# Patient Record
Sex: MALE | Race: WHITE | NOT HISPANIC OR LATINO | Employment: FULL TIME | URBAN - METROPOLITAN AREA
[De-identification: names, ages, dates, MRNs, and addresses within clinical notes are randomized per-mention and may not be internally consistent; named-entity substitution may affect disease eponyms.]

---

## 2020-04-15 ENCOUNTER — HOSPITAL ENCOUNTER (EMERGENCY)
Facility: HOSPITAL | Age: 45
Discharge: HOME | End: 2020-04-15
Payer: COMMERCIAL

## 2020-04-15 VITALS
TEMPERATURE: 98 F | RESPIRATION RATE: 18 BRPM | DIASTOLIC BLOOD PRESSURE: 73 MMHG | SYSTOLIC BLOOD PRESSURE: 139 MMHG | OXYGEN SATURATION: 100 % | HEART RATE: 74 BPM

## 2020-04-15 DIAGNOSIS — J06.9 VIRAL URI WITH COUGH: Primary | ICD-10-CM

## 2020-04-15 LAB — SARS-COV-2 RDRP RESP QL NAA+PROBE: NEGATIVE

## 2020-04-15 PROCEDURE — 99282 EMERGENCY DEPT VISIT SF MDM: CPT

## 2020-04-15 PROCEDURE — U0002 COVID-19 LAB TEST NON-CDC: HCPCS

## 2020-04-15 NOTE — ED PROVIDER NOTES
Encounter Date: 4/15/2020    SCRIBE #1 NOTE: ICrystal, am scribing for, and in the presence of, JOANNE JoseC.       History     Chief Complaint   Patient presents with    COVID-19 Concerns     cough, loss of taste and smell, tightness in chest        Time seen by provider: 2:23 PM on 04/15/2020    Jordan Mulligan is a 44 y.o. male who presents to the ED with c/o a dry cough x4 days. He reports associated loss of smell and taste and fever x4 days. No CP, SOB, or n/v/d. Patient reports positive COVID-19 contact from his ex wife. He has no other complaints. No PMHx or PSHx. NKDA.         Review of patient's allergies indicates:  No Known Allergies  No past medical history on file.  No past surgical history on file.  No family history on file.  Social History     Tobacco Use    Smoking status: Never Smoker    Smokeless tobacco: Current User   Substance Use Topics    Alcohol use: No    Drug use: No     Review of Systems   Constitutional: Positive for fever.   HENT: Negative for congestion.         + decrease in smell and taste   Eyes: Negative for visual disturbance.   Respiratory: Positive for cough (dry). Negative for shortness of breath and wheezing.    Cardiovascular: Negative for chest pain.   Gastrointestinal: Negative for abdominal pain, diarrhea, nausea and vomiting.   Genitourinary: Negative for dysuria.   Musculoskeletal: Negative for joint swelling.   Skin: Negative for rash.   Neurological: Negative for syncope.   Hematological: Does not bruise/bleed easily.   Psychiatric/Behavioral: Negative for confusion.       Physical Exam     Initial Vitals [04/15/20 1415]   BP Pulse Resp Temp SpO2   139/73 74 18 98.2 °F (36.8 °C) 100 %      MAP       --         Physical Exam    Nursing note and vitals reviewed.  Constitutional: He appears well-developed and well-nourished. He is cooperative.  Non-toxic appearance. He does not have a sickly appearance.   HENT:   Head: Normocephalic and atraumatic.    Right Ear: Tympanic membrane and external ear normal.   Left Ear: Tympanic membrane and external ear normal.   Nose: Nose normal.   Mouth/Throat: Uvula is midline and oropharynx is clear and moist.   Eyes: Conjunctivae and lids are normal.   Neck: Normal range of motion and full passive range of motion without pain. Neck supple.   Cardiovascular: Normal rate, regular rhythm and normal heart sounds. Exam reveals no gallop and no friction rub.    No murmur heard.  Pulmonary/Chest: Breath sounds normal. He has no wheezes. He has no rhonchi. He has no rales.   Abdominal: Normal appearance.   Neurological: He is alert.   Skin: Skin is warm, dry and intact. No rash noted.         ED Course   Procedures  Labs Reviewed   SARS-COV-2 RNA AMPLIFICATION, QUAL          Imaging Results    None          Medical Decision Making:   History:   Old Medical Records: I decided to obtain old medical records.  Differential Diagnosis:   URI  Bronchitis  Sinusitis  Clinical Tests:   Lab Tests: Ordered and Reviewed       APC / Resident Notes:   Patient is a 44 y.o. male who presents to the ED 04/15/2020 who underwent emergent evaluation for cough. + exposure to COVID- 19 positive patient and requesting testing. COVID- 19 test negative. Discussed with patient possibility of false negative testing and he is given quarantine and specific return precautions including worsening shortness of breath. The patient appears to have a viral upper respiratory infection.  Based upon the history and physical exam the patient does not appear to have a serious bacterial infection such as pneumonia, sepsis, otitis media, bacterial sinusitis, strep pharyngitis, parapharyngeal or peritonsillar abscess, meningitis.  Patient appears very well and I have given specific return precautions to the patient.rs.  The patient can take over the counter medications and does not appear to need antibiotics at this time.             Scribe Attestation:   Scribe #1: I  performed the above scribed service and the documentation accurately describes the services I performed. I attest to the accuracy of the note.    Attending Attestation:           Physician Attestation for Scribe:  Physician Attestation Statement for Scribe #1: I, Stephy Allen, reviewed documentation, as scribed by in my presence, and it is both accurate and complete.     Comments: I, RIKY Jose, personally performed the services described in this documentation. All medical record entries made by the scribe were at my direction and in my presence.  I have reviewed the chart and agree that the record reflects my personal performance and is accurate and complete. RIKY Jose.  3:15 PM 04/15/2020                           Clinical Impression:       ICD-10-CM ICD-9-CM   1. Viral URI with cough J06.9 465.9    B97.89          Disposition:   Disposition: Discharged  Condition: Stable                        Stephy Allen NP  04/15/20 1532

## 2020-04-15 NOTE — ED NOTES
Pt presents with cough, loss of taste and smell, tightness in chest.  Pt is awake, alert and oriented.  Resp even and unlabored. Pt reports exposure to covid positive person.

## 2020-04-15 NOTE — DISCHARGE INSTRUCTIONS
YOUR COVID-19 TEST TODAY IS NEGATIVE; Given state of community continue quarantine until asymptomatic with no fever reducing medications for 72 hours. .      Stay home and stay away from family members and friends. The CDC says, you can leave home after these three things have happened: 1) You have had no fever for at least 72 hours (that is three full days of no fever without the use of medicine that reduces fevers) 2) AND other symptoms have improved (for example, when your cough or shortness of breath have improved) 3) AND at least 7 days have passed since your symptoms first appeared.  Separate yourself from other people and animals in your home.  Call ahead before visiting your doctor.  Wear a facemask.  Cover your coughs and sneezes.  Wash your hands often with soap and water; hand  can be used, too.  Avoid sharing personal household items.  Wipe down surfaces used daily.  Monitor your symptoms. Seek prompt medical attention if your illness is worsening (e.g., difficulty breathing).   Before seeking care, call your healthcare provider.  If you have a medical emergency and need to call 911, notify the dispatch personnel that you have, or are being evaluated for COVID-19. If possible, put on a facemask before emergency medical services arrive.        Recommended precautions for household members, intimate partners, and caregivers in a home setting of a patient with symptomatic laboratory-confirmed COVID-19 or a patient under investigation.  Household members, intimate partners, and caregivers in the home setting awaiting tests results have close contact with a person with symptomatic, laboratory-confirmed COVID-19 or a person under investigation. Close contacts should monitor their health; they should call their provider right away if they develop symptoms suggestive of COVID-19 (e.g., fever, cough, shortness of breath).    Close contacts should also follow these recommendations:  Make sure that you  understand and can help the patient follow their provider's instructions for medication(s) and care. You should help the patient with basic needs in the home and provide support for getting groceries, prescriptions, and other personal needs.  Monitor the patient's symptoms. If the patient is getting sicker, call his or her healthcare provider and tell them that the patient has laboratory-confirmed COVID-19. If the patient has a medical emergency and you need to call 911, notify the dispatch personnel that the patient has, or is being evaluated for COVID-19.  Household members should stay in another room or be  from the patient. Household members should use a separate bedroom and bathroom, if available.  Prohibit visitors.  Household members should care for any pets in the home.  Make sure that shared spaces in the home have good air flow, such as by an air conditioner or an opened window, weather permitting.  Perform hand hygiene frequently. Wash your hands often with soap and water for at least 20 seconds or use an alcohol-based hand  (that contains > 60% alcohol) covering all surfaces of your hands and rubbing them together until they feel dry. Soap and water should be used preferentially.  Avoid touching your eyes, nose, and mouth.  The patient should wear a facemask. If the patient is not able to wear a facemask (for example, because it causes trouble breathing), caregivers should wear a mask when they are in the same room as the patient.  Wear a disposable facemask and gloves when you touch or have contact with the patient's blood, stool, or body fluids, such as saliva, sputum, nasal mucus, vomit, urine.  Throw out disposable facemasks and gloves after using them. Do not reuse.  When removing personal protective equipment, first remove and dispose of gloves. Then, immediately clean your hands with soap and water or alcohol-based hand . Next, remove and dispose of facemask, and  immediately clean your hands again with soap and water or alcohol-based hand .  You should not share dishes, drinking glasses, cups, eating utensils, towels, bedding, or other items with the patient. After the patient uses these items, you should wash them thoroughly (see below Wash laundry thoroughly).  Clean all high-touch surfaces, such as counters, tabletops, doorknobs, bathroom fixtures, toilets, phones, keyboards, tablets, and bedside tables, every day. Also, clean any surfaces that may have blood, stool, or body fluids on them.  Use a household cleaning spray or wipe, according to the label instructions. Labels contain instructions for safe and effective use of the cleaning product including precautions you should take when applying the product, such as wearing gloves and making sure you have good ventilation during use of the product.  Wash laundry thoroughly.  Immediately remove and wash clothes or bedding that have blood, stool, or body fluids on them.  Wear disposable gloves while handling soiled items and keep soiled items away from your body. Clean your hands (with soap and water or an alcohol-based hand ) immediately after removing your gloves.  Read and follow directions on labels of laundry or clothing items and detergent. In general, using a normal laundry detergent according to washing machine instructions and dry thoroughly using the warmest temperatures recommended on the clothing label.  Place all used disposable gloves, facemasks, and other contaminated items in a lined container before disposing of them with other household waste. Clean your hands (with soap and water or an alcohol-based hand ) immediately after handling these items. Soap and water should be used preferentially if hands are visibly dirty.  Discuss any additional questions with your state or local health department or healthcare provider. Check available hours when contacting your local health  department.    For more information see CDC link below.      https://www.cdc.gov/coronavirus/2019-ncov/hcp/guidance-prevent-spread.html#precautions        Sources:  [facility], [place] Department [facility] and [facility]

## 2020-08-12 ENCOUNTER — OFFICE VISIT (OUTPATIENT)
Dept: FAMILY MEDICINE | Facility: CLINIC | Age: 45
End: 2020-08-12
Payer: COMMERCIAL

## 2020-08-12 VITALS
WEIGHT: 159.88 LBS | HEART RATE: 80 BPM | DIASTOLIC BLOOD PRESSURE: 76 MMHG | OXYGEN SATURATION: 98 % | RESPIRATION RATE: 16 BRPM | HEIGHT: 67 IN | TEMPERATURE: 99 F | SYSTOLIC BLOOD PRESSURE: 120 MMHG | BODY MASS INDEX: 25.09 KG/M2

## 2020-08-12 DIAGNOSIS — Z00.00 ROUTINE HEALTH MAINTENANCE: ICD-10-CM

## 2020-08-12 DIAGNOSIS — N63.10 BREAST MASS, RIGHT: Primary | ICD-10-CM

## 2020-08-12 PROCEDURE — 99203 OFFICE O/P NEW LOW 30 MIN: CPT | Mod: S$GLB,,, | Performed by: INTERNAL MEDICINE

## 2020-08-12 PROCEDURE — 99203 PR OFFICE/OUTPT VISIT, NEW, LEVL III, 30-44 MIN: ICD-10-PCS | Mod: S$GLB,,, | Performed by: INTERNAL MEDICINE

## 2020-08-12 PROCEDURE — 3008F BODY MASS INDEX DOCD: CPT | Mod: S$GLB,,, | Performed by: INTERNAL MEDICINE

## 2020-08-12 PROCEDURE — 3008F PR BODY MASS INDEX (BMI) DOCUMENTED: ICD-10-PCS | Mod: S$GLB,,, | Performed by: INTERNAL MEDICINE

## 2020-08-12 RX ORDER — VALACYCLOVIR HYDROCHLORIDE 1 G/1
1000 TABLET, FILM COATED ORAL 2 TIMES DAILY
Qty: 20 TABLET | Refills: 0 | Status: SHIPPED | OUTPATIENT
Start: 2020-08-12 | End: 2020-09-08 | Stop reason: SDUPTHER

## 2020-08-12 RX ORDER — DEXTROAMPHETAMINE SACCHARATE, AMPHETAMINE ASPARTATE MONOHYDRATE, DEXTROAMPHETAMINE SULFATE AND AMPHETAMINE SULFATE 7.5; 7.5; 7.5; 7.5 MG/1; MG/1; MG/1; MG/1
1 CAPSULE, EXTENDED RELEASE ORAL 2 TIMES DAILY
COMMUNITY
Start: 2020-07-30

## 2020-08-12 RX ORDER — VALACYCLOVIR HYDROCHLORIDE 1 G/1
1 TABLET, FILM COATED ORAL 2 TIMES DAILY
COMMUNITY
Start: 2020-07-31 | End: 2023-09-29

## 2020-08-12 RX ORDER — ALPRAZOLAM 1 MG/1
1 TABLET ORAL 3 TIMES DAILY PRN
COMMUNITY
Start: 2020-06-27 | End: 2023-09-29

## 2020-08-12 NOTE — PROGRESS NOTES
"  SUBJECTIVE:    Patient ID: Jordan Mulligan is a 44 y.o. male.    Chief Complaint: Mass (on right nipple)    HPI     Patient in for evaluation of worrisome lesion right nipple-two weeks-it is not red or tender but it was tender at one point-no obvious trauma      Admission on 04/15/2020, Discharged on 04/15/2020   Component Date Value Ref Range Status    SARS-CoV-2 RNA, Amplification, Qual 04/15/2020 Negative  Negative Final       History reviewed. No pertinent past medical history.  Past Surgical History:   Procedure Laterality Date    ANKLE SURGERY  2014     Family History   Problem Relation Age of Onset    No Known Problems Mother     No Known Problems Father     No Known Problems Sister     No Known Problems Brother        Marital Status:   Alcohol History:  reports current alcohol use.  Tobacco History:  reports that he has never smoked. His smokeless tobacco use includes chew.  Drug History:  reports no history of drug use.    Review of patient's allergies indicates:   Allergen Reactions    Codeine      Patient was told so he has stayed away from it       Current Outpatient Medications:     ALPRAZolam (XANAX) 1 MG tablet, Take 1 tablet by mouth 3 (three) times daily as needed., Disp: , Rfl:     dextroamphetamine-amphetamine (ADDERALL XR) 30 MG 24 hr capsule, Take 1 capsule by mouth 2 (two) times a day. , Disp: , Rfl:     valACYclovir (VALTREX) 1000 MG tablet, Take 1 tablet by mouth 2 (two) times a day., Disp: , Rfl:     valACYclovir (VALTREX) 1000 MG tablet, Take 1 tablet (1,000 mg total) by mouth 2 (two) times daily., Disp: 20 tablet, Rfl: 0    Review of Systems   All other systems reviewed and are negative.         Objective:      Vitals:    08/12/20 1421   BP: 120/76   Pulse: 80   Resp: 16   Temp: 98.6 °F (37 °C)   SpO2: 98%   Weight: 72.5 kg (159 lb 14.4 oz)   Height: 5' 7" (1.702 m)     Physical Exam  Vitals signs and nursing note reviewed.   Constitutional:       Appearance: Normal " appearance. He is normal weight.   HENT:      Head: Normocephalic and atraumatic.   Eyes:      General: No scleral icterus.        Right eye: No discharge.         Left eye: No discharge.      Conjunctiva/sclera: Conjunctivae normal.      Pupils: Pupils are equal, round, and reactive to light.   Cardiovascular:      Rate and Rhythm: Normal rate and regular rhythm.   Chest:      Breasts:         Right: Mass present.            Comments: Bilobed firm mass 11 o clock with outward extension-no axillary nodes-three dimensional-freely movable-  Lymphadenopathy:      Upper Body:      Right upper body: No supraclavicular, axillary or pectoral adenopathy.   Neurological:      Mental Status: He is alert.           Assessment:       1. Breast mass, right    2. Routine health maintenance         Plan:       Breast mass, right  -     Mammo Digital Diagnostic Right without C; Future; Expected date: 08/13/2020  -      Breast Bilateral Limited; Future; Expected date: 08/13/2020  -     Mammo Digital Diagnostic Bilat w/ Jorge; Future; Expected date: 08/12/2020    Routine health maintenance  -     Comprehensive metabolic panel; Future; Expected date: 08/12/2020  -     Lipid Panel; Future; Expected date: 08/12/2020  -     Urinalysis; Future; Expected date: 08/13/2020    Other orders  -     valACYclovir (VALTREX) 1000 MG tablet; Take 1 tablet (1,000 mg total) by mouth 2 (two) times daily.  Dispense: 20 tablet; Refill: 0      No follow-ups on file.        8/12/2020 Saida Wlikins M.D.

## 2020-08-13 ENCOUNTER — TELEPHONE (OUTPATIENT)
Dept: FAMILY MEDICINE | Facility: CLINIC | Age: 45
End: 2020-08-13

## 2020-08-13 DIAGNOSIS — N63.11 UNSPECIFIED LUMP IN THE RIGHT BREAST, UPPER OUTER QUADRANT: Primary | ICD-10-CM

## 2020-08-13 NOTE — TELEPHONE ENCOUNTER
----- Message from Mallorie Nieto sent at 8/13/2020  9:02 AM CDT -----  Regarding: NEED PAYABLE CODE  GOOD MORNING,     I SEE WHERE A CORRECTED ORDER FOR DAIG MAMMO BILATERAL WAS SENT OVER BUT THE CODE LISTED IS A NON PAYABLE CODE. IS IT POSSIBLE WE CAN CORRECT AND RESEND WITH PAYABLE CODE PLEASE BE SURE THE SAME CODE IS ALSO CORRECTED ON THE BREAST ULTRASOUND. THANKS, JEAN PIERRE

## 2020-08-19 ENCOUNTER — TELEPHONE (OUTPATIENT)
Dept: FAMILY MEDICINE | Facility: CLINIC | Age: 45
End: 2020-08-19

## 2020-08-19 DIAGNOSIS — N63.11 UNSPECIFIED LUMP IN THE RIGHT BREAST, UPPER OUTER QUADRANT: Primary | ICD-10-CM

## 2020-08-19 NOTE — TELEPHONE ENCOUNTER
Nothing seen but I still want him to see Dr Gallegos because we felt a definite mass outside the areola

## 2020-08-19 NOTE — TELEPHONE ENCOUNTER
----- Message from Mariola Paul LPN sent at 8/19/2020 10:13 AM CDT -----  Mammo and US DIS results  ----- Message -----  From: Erin Castillo  Sent: 8/18/2020   3:10 PM CDT  To: Claudette Cintron Staff    Mammogram and US 8/18/20

## 2020-09-08 ENCOUNTER — OFFICE VISIT (OUTPATIENT)
Dept: SURGERY | Facility: CLINIC | Age: 45
End: 2020-09-08
Payer: COMMERCIAL

## 2020-09-08 VITALS
BODY MASS INDEX: 24.96 KG/M2 | DIASTOLIC BLOOD PRESSURE: 85 MMHG | WEIGHT: 159 LBS | SYSTOLIC BLOOD PRESSURE: 145 MMHG | TEMPERATURE: 98 F | HEIGHT: 67 IN | HEART RATE: 66 BPM

## 2020-09-08 DIAGNOSIS — N62 SUBAREOLAR GYNECOMASTIA IN MALE: Primary | ICD-10-CM

## 2020-09-08 PROCEDURE — 99243 OFF/OP CNSLTJ NEW/EST LOW 30: CPT | Mod: S$GLB,,, | Performed by: SURGERY

## 2020-09-08 PROCEDURE — 99243 PR OFFICE CONSULTATION,LEVEL III: ICD-10-PCS | Mod: S$GLB,,, | Performed by: SURGERY

## 2020-09-08 NOTE — LETTER
September 8, 2020      Saida Wilkins MD  [address]  [place] [address]           [facility]  [address]  [place] [address]  Phone: [phone]  Fax: [phone]          Patient: Jordan Mulligan   MR Number: [id number]   YOB: 1975   Date of Visit: 9/8/2020       Dear Dr. Saida Wilkins:    Thank you for referring Jordan Mulligan to me for evaluation. Attached you will find relevant portions of my assessment and plan of care.    If you have questions, please do not hesitate to call me. I look forward to following Jordan Mulligan along with you.    Sincerely,    Sanjay Haro III, MD    Enclosure  CC:  No Recipients    If you would like to receive this communication electronically, please contact [email or web address] or [phone] to request more information on [employer] Link access.    For providers and/or their staff who would like to refer a patient to [facility], please contact us through our one-stop-shop provider referral line, [facility], at [phone].    If you feel you have received this communication in error or would no longer like to receive these types of communications, please e-mail [email or web address]

## 2020-09-08 NOTE — PROGRESS NOTES
Subjective:       Patient ID: Jordan Mulligan is a 44 y.o. male.    Chief Complaint: No chief complaint on file.      HPI:  Patient is 44 year old male referred to the office with a palpable right subareolar mass. Mass is minimally tender. He first noticed it about two months ago. He had MMG and US that showed no evidence of a mass or suspicious area BI RADS 2. He states the mass may be little larger since he first noticed it. He has not started any new medication. He reports occasional alcohol use. He is a daily smokeless tobacco user. He reports no illicit drug use. He has no family history of breast cancer.     No past medical history on file.  Past Surgical History:   Procedure Laterality Date    ANKLE SURGERY Left 2014     Review of patient's allergies indicates:   Allergen Reactions    Codeine      Patient was told so he has stayed away from it     Medication List with Changes/Refills   Current Medications    ALPRAZOLAM (XANAX) 1 MG TABLET    Take 1 tablet by mouth 3 (three) times daily as needed.    DEXTROAMPHETAMINE-AMPHETAMINE (ADDERALL XR) 30 MG 24 HR CAPSULE    Take 1 capsule by mouth 2 (two) times a day.     VALACYCLOVIR (VALTREX) 1000 MG TABLET    Take 1 tablet by mouth 2 (two) times a day.   Discontinued Medications    VALACYCLOVIR (VALTREX) 1000 MG TABLET    Take 1 tablet (1,000 mg total) by mouth 2 (two) times daily.     Family History   Problem Relation Age of Onset    No Known Problems Mother     No Known Problems Father     No Known Problems Sister     No Known Problems Brother      Social History     Socioeconomic History    Marital status:      Spouse name: Not on file    Number of children: Not on file    Years of education: Not on file    Highest education level: Not on file   Occupational History    Not on file   Social Needs    Financial resource strain: Not on file    Food insecurity     Worry: Not on file     Inability: Not on file    Transportation needs      Medical: Not on file     Non-medical: Not on file   Tobacco Use    Smoking status: Never Smoker    Smokeless tobacco: Current User     Types: Chew   Substance and Sexual Activity    Alcohol use: Yes     Comment: very rare    Drug use: No    Sexual activity: Not on file   Lifestyle    Physical activity     Days per week: Not on file     Minutes per session: Not on file    Stress: Not on file   Relationships    Social connections     Talks on phone: Not on file     Gets together: Not on file     Attends [employer] service: Not on file     Active member of club or organization: Not on file     Attends meetings of clubs or organizations: Not on file     Relationship status: Not on file   Other Topics Concern    Not on file   Social History Narrative    Not on file         Review of Systems   Constitutional: Negative for appetite change, chills, fever and unexpected weight change.   HENT: Negative for hearing loss, rhinorrhea, sore throat and voice change.    Eyes: Negative for photophobia and visual disturbance.   Respiratory: Negative for cough, choking and shortness of breath.    Cardiovascular: Negative for chest pain, palpitations and leg swelling.   Gastrointestinal: Negative for abdominal pain, blood in stool, constipation, diarrhea, nausea and vomiting.   Endocrine: Negative for cold intolerance, heat intolerance and polyphagia.   Genitourinary: Negative for dysuria.   Musculoskeletal: Negative for arthralgias and back pain.   Skin: Negative for color change.   Neurological: Negative for dizziness, seizures, syncope and headaches.   Hematological: Negative for adenopathy. Does not bruise/bleed easily.       Objective:      Physical Exam  Constitutional:       General: He is not in acute distress.     Appearance: Normal appearance. He is well-developed. He is not toxic-appearing.   HENT:      Head: Normocephalic and atraumatic. No abrasion or laceration.      Right Ear: External ear normal.      Left Ear:  External ear normal.      Nose: Nose normal.   Eyes:      Pupils: Pupils are equal, round, and reactive to light.   Neck:      Musculoskeletal: Neck supple. Normal range of motion.      Trachea: Trachea and phonation normal. No tracheal deviation.   Cardiovascular:      Rate and Rhythm: Normal rate and regular rhythm.   Pulmonary:      Effort: Pulmonary effort is normal. No tachypnea, accessory muscle usage or respiratory distress.   Chest:      Breasts:         Right: Mass present. No inverted nipple, nipple discharge, skin change or tenderness.         Left: No inverted nipple, mass, nipple discharge, skin change or tenderness.          Comments: 2cm subareolar mass -  under the upper outer aspect of the areola. Non tender. No skin changes.   Abdominal:      General: There is no distension.      Palpations: Abdomen is soft.      Tenderness: There is no abdominal tenderness. There is no guarding or rebound.   Lymphadenopathy:      Cervical: No cervical adenopathy.      Upper Body:      Right upper body: No supraclavicular or axillary adenopathy.      Left upper body: No supraclavicular or axillary adenopathy.   Skin:     General: Skin is warm.   Neurological:      Mental Status: He is alert and oriented to person, place, and time.      Coordination: Coordination normal.      Gait: Gait normal.   Psychiatric:         Speech: Speech normal.         Behavior: Behavior normal.         Assessment/Plan:     Diagnoses and all orders for this visit:    Subareolar gynecomastia in male      -Clinically patient has a right sided gynecomastia. Will observe for now. He will RTC in 8 weeks to revaluate. Will needle biopsy or completely excise if indicated.

## 2020-12-17 ENCOUNTER — OFFICE VISIT (OUTPATIENT)
Dept: PODIATRY | Facility: CLINIC | Age: 45
End: 2020-12-17
Payer: COMMERCIAL

## 2020-12-17 VITALS — WEIGHT: 172 LBS | HEIGHT: 67 IN | RESPIRATION RATE: 16 BRPM | BODY MASS INDEX: 27 KG/M2

## 2020-12-17 DIAGNOSIS — L60.0 INGROWN NAIL: Primary | ICD-10-CM

## 2020-12-17 PROCEDURE — 99203 PR OFFICE/OUTPT VISIT, NEW, LEVL III, 30-44 MIN: ICD-10-PCS | Mod: 25,S$GLB,, | Performed by: PODIATRIST

## 2020-12-17 PROCEDURE — 3008F PR BODY MASS INDEX (BMI) DOCUMENTED: ICD-10-PCS | Mod: CPTII,S$GLB,, | Performed by: PODIATRIST

## 2020-12-17 PROCEDURE — 11730 NAIL REMOVAL: ICD-10-PCS | Mod: T5,S$GLB,, | Performed by: PODIATRIST

## 2020-12-17 PROCEDURE — 99203 OFFICE O/P NEW LOW 30 MIN: CPT | Mod: 25,S$GLB,, | Performed by: PODIATRIST

## 2020-12-17 PROCEDURE — 11730 AVULSION NAIL PLATE SIMPLE 1: CPT | Mod: T5,S$GLB,, | Performed by: PODIATRIST

## 2020-12-17 PROCEDURE — 3008F BODY MASS INDEX DOCD: CPT | Mod: CPTII,S$GLB,, | Performed by: PODIATRIST

## 2020-12-17 NOTE — PROGRESS NOTES
"  [address]  [place] [address]  Phone: [phone]   [phone]    Patient's PCP:Saida Wilkins MD  Referring Provider: Aaareferral Self    Subjective:      Chief Complaint:: Ingrown Toenail (right 1st medial aspect)    HPI  Jordan Mulligan is a 45 y.o. male who presents with a complaint of ingrown toenail right 1st medial aspect. Onset of symptoms patient was trimming his nails and trimmed it too short.  Current symptoms include tender.  Aggravating factors are pressure. Treatment to date have included self-trimming/self-digging.    Vitals:    12/17/20 1524   Resp: 16   Weight: 78 kg (172 lb)   Height: 5' 7" (1.702 m)      Shoe Size:     Past Surgical History:   Procedure Laterality Date    ANKLE SURGERY Left 2014     History reviewed. No pertinent past medical history.  Family History   Problem Relation Age of Onset    No Known Problems Mother     No Known Problems Father     No Known Problems Sister     No Known Problems Brother         Social History:   Marital Status:   Alcohol History:  reports current alcohol use.  Tobacco History:  reports that he has never smoked. His smokeless tobacco use includes chew.  Drug History:  reports no history of drug use.    Review of patient's allergies indicates:   Allergen Reactions    Codeine      Patient was told so he has stayed away from it       Current Outpatient Medications   Medication Sig Dispense Refill    ALPRAZolam (XANAX) 1 MG tablet Take 1 tablet by mouth 3 (three) times daily as needed.      dextroamphetamine-amphetamine (ADDERALL XR) 30 MG 24 hr capsule Take 1 capsule by mouth 2 (two) times a day.       valACYclovir (VALTREX) 1000 MG tablet Take 1 tablet by mouth 2 (two) times a day.       No current facility-administered medications for this visit.        Review of Systems      Objective:        Physical Exam:   Foot Exam    General  General Appearance: appears stated age and healthy   Orientation: alert and " oriented to person, place, and time   Affect: appropriate   Gait: unimpaired       Right Foot/Ankle     Inspection and Palpation  Ecchymosis: none  Tenderness: (Medial border right 1st toenail)  Swelling: none   Skin Exam: (Ingrown with mild infection medial border right 1st toenail)    Neurovascular  Dorsalis pedis: 2+  Posterior tibial: 2+  Saphenous nerve sensation: normal  Tibial nerve sensation: normal  Superficial peroneal nerve sensation: normal  Deep peroneal nerve sensation: normal  Sural nerve sensation: normal          Physical Exam   Cardiovascular:   Pulses:       Dorsalis pedis pulses are 2+ on the right side.        Posterior tibial pulses are 2+ on the right side.   Musculoskeletal:        Feet:            Imaging:            Assessment:       1. Ingrown nail - Right Foot      Plan:   Ingrown nail - Right Foot     1.  I evaluate patient described clinical findings of infected ingrown toenail medial border right 1st toe.  2.Ingrown toenail treatment options of no treatment, avulsion of nail border under local with regrowth of nail, chemical matrixectomy for attempted permanent correction of the problem. Patient was educated about daily dressing changes, soaks, and medications following removal of the nail.   3.  Return as needed  No follow-ups on file.    Nail Removal    Date/Time: 12/17/2020 3:10 PM  Performed by: Driss Weldon DPM  Authorized by: Driss Weldon DPM     Consent Done?:  Yes (Written)    Location:  Right foot  Location detail:  Right big toe  Anesthesia:  Digital block  Local anesthetic: lidocaine 2% without epinephrine  Anesthetic total (ml):  5  Preparation:  Skin prepped with alcohol    Amount removed:  Partial  Wedge excision of skin of nail fold: No    Nail bed sutured?: No    Nail matrix removed:  None  Removed nail replaced and anchored: No    Dressing applied:  4x4 and gauze roll     Neosporin and tube gauze dressing daily until dry.  Return as needed    No notes on  file       Counseling:     I provided patient education verbally regarding:   Patient diagnosis, treatment options, as well as alternatives, risks, and benefits.     This note was created using Dragon voice recognition software that occasionally misinterpreted phrases or words.

## 2020-12-23 ENCOUNTER — HOSPITAL ENCOUNTER (OUTPATIENT)
Facility: HOSPITAL | Age: 45
Discharge: HOME | End: 2020-12-24
Attending: EMERGENCY MEDICINE | Admitting: INTERNAL MEDICINE
Payer: COMMERCIAL

## 2020-12-23 DIAGNOSIS — T14.90XA TRAUMA: ICD-10-CM

## 2020-12-23 DIAGNOSIS — R07.9 CHEST PAIN: ICD-10-CM

## 2020-12-23 DIAGNOSIS — S82.90XA LEG FRACTURE: ICD-10-CM

## 2020-12-23 LAB
ABO + RH BLD: NORMAL
ALBUMIN SERPL BCP-MCNC: 4.1 G/DL (ref 3.5–5.2)
ALP SERPL-CCNC: 53 U/L (ref 55–135)
ALT SERPL W/O P-5'-P-CCNC: 24 U/L (ref 10–44)
ANION GAP SERPL CALC-SCNC: 9 MMOL/L (ref 8–16)
APTT PPP: 28.8 SEC (ref 23.6–33.3)
AST SERPL-CCNC: 28 U/L (ref 10–40)
BASOPHILS # BLD AUTO: 0.06 K/UL (ref 0–0.2)
BASOPHILS NFR BLD: 0.7 % (ref 0–1.9)
BILIRUB SERPL-MCNC: 0.6 MG/DL (ref 0.1–1)
BLD GP AB SCN CELLS X3 SERPL QL: NORMAL
BUN SERPL-MCNC: 16 MG/DL (ref 6–20)
CALCIUM SERPL-MCNC: 9 MG/DL (ref 8.7–10.5)
CHLORIDE SERPL-SCNC: 102 MMOL/L (ref 95–110)
CO2 SERPL-SCNC: 28 MMOL/L (ref 23–29)
CREAT SERPL-MCNC: 1.2 MG/DL (ref 0.5–1.4)
DIFFERENTIAL METHOD: NORMAL
EOSINOPHIL # BLD AUTO: 0.1 K/UL (ref 0–0.5)
EOSINOPHIL NFR BLD: 1.5 % (ref 0–8)
ERYTHROCYTE [DISTWIDTH] IN BLOOD BY AUTOMATED COUNT: 12.7 % (ref 11.5–14.5)
EST. GFR  (AFRICAN AMERICAN): >60 ML/MIN/1.73 M^2
EST. GFR  (NON AFRICAN AMERICAN): >60 ML/MIN/1.73 M^2
GLUCOSE SERPL-MCNC: 105 MG/DL (ref 70–110)
HCT VFR BLD AUTO: 47.5 % (ref 40–54)
HGB BLD-MCNC: 15.7 G/DL (ref 14–18)
IMM GRANULOCYTES # BLD AUTO: 0.02 K/UL (ref 0–0.04)
IMM GRANULOCYTES NFR BLD AUTO: 0.2 % (ref 0–0.5)
INR PPP: 1.1
LYMPHOCYTES # BLD AUTO: 2.1 K/UL (ref 1–4.8)
LYMPHOCYTES NFR BLD: 25.3 % (ref 18–48)
MCH RBC QN AUTO: 30.7 PG (ref 27–31)
MCHC RBC AUTO-ENTMCNC: 33.1 G/DL (ref 32–36)
MCV RBC AUTO: 93 FL (ref 82–98)
MONOCYTES # BLD AUTO: 0.5 K/UL (ref 0.3–1)
MONOCYTES NFR BLD: 6.5 % (ref 4–15)
NEUTROPHILS # BLD AUTO: 5.3 K/UL (ref 1.8–7.7)
NEUTROPHILS NFR BLD: 65.8 % (ref 38–73)
NRBC BLD-RTO: 0 /100 WBC
PLATELET # BLD AUTO: 312 K/UL (ref 150–350)
PMV BLD AUTO: 9.3 FL (ref 9.2–12.9)
POTASSIUM SERPL-SCNC: 3.5 MMOL/L (ref 3.5–5.1)
PROT SERPL-MCNC: 7 G/DL (ref 6–8.4)
PROTHROMBIN TIME: 13.9 SEC (ref 10.6–14.8)
RBC # BLD AUTO: 5.11 M/UL (ref 4.6–6.2)
SARS-COV-2 RDRP RESP QL NAA+PROBE: NEGATIVE
SODIUM SERPL-SCNC: 139 MMOL/L (ref 136–145)
WBC # BLD AUTO: 8.13 K/UL (ref 3.9–12.7)

## 2020-12-23 PROCEDURE — 93010 EKG 12-LEAD: ICD-10-PCS | Mod: ,,, | Performed by: INTERNAL MEDICINE

## 2020-12-23 PROCEDURE — 86850 RBC ANTIBODY SCREEN: CPT

## 2020-12-23 PROCEDURE — 36415 COLL VENOUS BLD VENIPUNCTURE: CPT

## 2020-12-23 PROCEDURE — U0002 COVID-19 LAB TEST NON-CDC: HCPCS

## 2020-12-23 PROCEDURE — 25000003 PHARM REV CODE 250: Performed by: EMERGENCY MEDICINE

## 2020-12-23 PROCEDURE — 99285 EMERGENCY DEPT VISIT HI MDM: CPT | Mod: 25

## 2020-12-23 PROCEDURE — 85610 PROTHROMBIN TIME: CPT

## 2020-12-23 PROCEDURE — 85025 COMPLETE CBC W/AUTO DIFF WBC: CPT

## 2020-12-23 PROCEDURE — 93005 ELECTROCARDIOGRAM TRACING: CPT | Performed by: INTERNAL MEDICINE

## 2020-12-23 PROCEDURE — G0378 HOSPITAL OBSERVATION PER HR: HCPCS

## 2020-12-23 PROCEDURE — 85730 THROMBOPLASTIN TIME PARTIAL: CPT

## 2020-12-23 PROCEDURE — 80053 COMPREHEN METABOLIC PANEL: CPT

## 2020-12-23 PROCEDURE — 93010 ELECTROCARDIOGRAM REPORT: CPT | Mod: ,,, | Performed by: INTERNAL MEDICINE

## 2020-12-23 RX ORDER — ALPRAZOLAM 0.5 MG/1
1 TABLET ORAL 2 TIMES DAILY PRN
Status: DISCONTINUED | OUTPATIENT
Start: 2020-12-23 | End: 2020-12-24 | Stop reason: HOSPADM

## 2020-12-23 RX ORDER — ONDANSETRON 4 MG/1
4 TABLET, ORALLY DISINTEGRATING ORAL
Status: COMPLETED | OUTPATIENT
Start: 2020-12-23 | End: 2020-12-23

## 2020-12-23 RX ORDER — HYDROCODONE BITARTRATE AND ACETAMINOPHEN 5; 325 MG/1; MG/1
1 TABLET ORAL
Status: COMPLETED | OUTPATIENT
Start: 2020-12-23 | End: 2020-12-23

## 2020-12-23 RX ORDER — ONDANSETRON 2 MG/ML
4 INJECTION INTRAMUSCULAR; INTRAVENOUS EVERY 6 HOURS PRN
Status: DISCONTINUED | OUTPATIENT
Start: 2020-12-23 | End: 2020-12-24 | Stop reason: HOSPADM

## 2020-12-23 RX ORDER — HYDROCODONE BITARTRATE AND ACETAMINOPHEN 5; 325 MG/1; MG/1
1 TABLET ORAL EVERY 6 HOURS PRN
Status: DISCONTINUED | OUTPATIENT
Start: 2020-12-23 | End: 2020-12-24

## 2020-12-23 RX ORDER — SODIUM CHLORIDE 0.9 % (FLUSH) 0.9 %
10 SYRINGE (ML) INJECTION
Status: DISCONTINUED | OUTPATIENT
Start: 2020-12-23 | End: 2020-12-24 | Stop reason: HOSPADM

## 2020-12-23 RX ORDER — VALACYCLOVIR HYDROCHLORIDE 500 MG/1
1000 TABLET, FILM COATED ORAL 2 TIMES DAILY
Status: DISCONTINUED | OUTPATIENT
Start: 2020-12-23 | End: 2020-12-23

## 2020-12-23 RX ORDER — MORPHINE SULFATE 2 MG/ML
2 INJECTION, SOLUTION INTRAMUSCULAR; INTRAVENOUS EVERY 4 HOURS PRN
Status: DISCONTINUED | OUTPATIENT
Start: 2020-12-23 | End: 2020-12-24 | Stop reason: HOSPADM

## 2020-12-23 RX ORDER — ACETAMINOPHEN 325 MG/1
650 TABLET ORAL EVERY 4 HOURS PRN
Status: DISCONTINUED | OUTPATIENT
Start: 2020-12-23 | End: 2020-12-24 | Stop reason: HOSPADM

## 2020-12-23 RX ORDER — AMOXICILLIN 250 MG
1 CAPSULE ORAL 2 TIMES DAILY PRN
Status: DISCONTINUED | OUTPATIENT
Start: 2020-12-23 | End: 2020-12-24 | Stop reason: HOSPADM

## 2020-12-23 RX ADMIN — HYDROCODONE BITARTRATE AND ACETAMINOPHEN 1 TABLET: 5; 325 TABLET ORAL at 08:12

## 2020-12-23 RX ADMIN — ONDANSETRON 4 MG: 4 TABLET, ORALLY DISINTEGRATING ORAL at 08:12

## 2020-12-24 ENCOUNTER — ANESTHESIA EVENT (OUTPATIENT)
Dept: SURGERY | Facility: HOSPITAL | Age: 45
End: 2020-12-24
Payer: COMMERCIAL

## 2020-12-24 ENCOUNTER — ANESTHESIA (OUTPATIENT)
Dept: SURGERY | Facility: HOSPITAL | Age: 45
End: 2020-12-24
Payer: COMMERCIAL

## 2020-12-24 VITALS
HEIGHT: 67 IN | DIASTOLIC BLOOD PRESSURE: 81 MMHG | WEIGHT: 172 LBS | OXYGEN SATURATION: 97 % | RESPIRATION RATE: 18 BRPM | SYSTOLIC BLOOD PRESSURE: 128 MMHG | HEART RATE: 53 BPM | BODY MASS INDEX: 27 KG/M2 | TEMPERATURE: 98 F

## 2020-12-24 LAB
ANION GAP SERPL CALC-SCNC: 8 MMOL/L (ref 8–16)
BUN SERPL-MCNC: 13 MG/DL (ref 6–20)
CALCIUM SERPL-MCNC: 8.6 MG/DL (ref 8.7–10.5)
CHLORIDE SERPL-SCNC: 106 MMOL/L (ref 95–110)
CO2 SERPL-SCNC: 25 MMOL/L (ref 23–29)
CREAT SERPL-MCNC: 1 MG/DL (ref 0.5–1.4)
ERYTHROCYTE [DISTWIDTH] IN BLOOD BY AUTOMATED COUNT: 12.7 % (ref 11.5–14.5)
EST. GFR  (AFRICAN AMERICAN): >60 ML/MIN/1.73 M^2
EST. GFR  (NON AFRICAN AMERICAN): >60 ML/MIN/1.73 M^2
GLUCOSE SERPL-MCNC: 93 MG/DL (ref 70–110)
HCT VFR BLD AUTO: 45.8 % (ref 40–54)
HGB BLD-MCNC: 15.5 G/DL (ref 14–18)
MAGNESIUM SERPL-MCNC: 2 MG/DL (ref 1.6–2.6)
MCH RBC QN AUTO: 31.6 PG (ref 27–31)
MCHC RBC AUTO-ENTMCNC: 33.8 G/DL (ref 32–36)
MCV RBC AUTO: 94 FL (ref 82–98)
PLATELET # BLD AUTO: 305 K/UL (ref 150–350)
PMV BLD AUTO: 9.7 FL (ref 9.2–12.9)
POTASSIUM SERPL-SCNC: 4 MMOL/L (ref 3.5–5.1)
RBC # BLD AUTO: 4.9 M/UL (ref 4.6–6.2)
SODIUM SERPL-SCNC: 139 MMOL/L (ref 136–145)
WBC # BLD AUTO: 6.89 K/UL (ref 3.9–12.7)

## 2020-12-24 PROCEDURE — 25000003 PHARM REV CODE 250: Performed by: NURSE PRACTITIONER

## 2020-12-24 PROCEDURE — 37000008 HC ANESTHESIA 1ST 15 MINUTES: Performed by: ORTHOPAEDIC SURGERY

## 2020-12-24 PROCEDURE — 85027 COMPLETE CBC AUTOMATED: CPT

## 2020-12-24 PROCEDURE — 36000709 HC OR TIME LEV III EA ADD 15 MIN: Performed by: ORTHOPAEDIC SURGERY

## 2020-12-24 PROCEDURE — 27201107 HC STYLET, STANDARD: Performed by: ANESTHESIOLOGY

## 2020-12-24 PROCEDURE — 71000039 HC RECOVERY, EACH ADD'L HOUR: Performed by: ORTHOPAEDIC SURGERY

## 2020-12-24 PROCEDURE — 27201423 OPTIME MED/SURG SUP & DEVICES STERILE SUPPLY: Performed by: ORTHOPAEDIC SURGERY

## 2020-12-24 PROCEDURE — G0378 HOSPITAL OBSERVATION PER HR: HCPCS

## 2020-12-24 PROCEDURE — 36415 COLL VENOUS BLD VENIPUNCTURE: CPT

## 2020-12-24 PROCEDURE — 63600175 PHARM REV CODE 636 W HCPCS: Performed by: ANESTHESIOLOGY

## 2020-12-24 PROCEDURE — 63600175 PHARM REV CODE 636 W HCPCS: Performed by: NURSE PRACTITIONER

## 2020-12-24 PROCEDURE — 27000673 HC TUBING BLOOD Y: Performed by: ANESTHESIOLOGY

## 2020-12-24 PROCEDURE — 83735 ASSAY OF MAGNESIUM: CPT

## 2020-12-24 PROCEDURE — 94761 N-INVAS EAR/PLS OXIMETRY MLT: CPT

## 2020-12-24 PROCEDURE — 27202105 HC BIS BILATERAL SENSOR: Performed by: ANESTHESIOLOGY

## 2020-12-24 PROCEDURE — 80048 BASIC METABOLIC PNL TOTAL CA: CPT

## 2020-12-24 PROCEDURE — 36000708 HC OR TIME LEV III 1ST 15 MIN: Performed by: ORTHOPAEDIC SURGERY

## 2020-12-24 PROCEDURE — C1713 ANCHOR/SCREW BN/BN,TIS/BN: HCPCS | Performed by: ORTHOPAEDIC SURGERY

## 2020-12-24 PROCEDURE — 27000671 HC TUBING MICROBORE EXT: Performed by: ANESTHESIOLOGY

## 2020-12-24 PROCEDURE — C1769 GUIDE WIRE: HCPCS | Performed by: ORTHOPAEDIC SURGERY

## 2020-12-24 PROCEDURE — 71000033 HC RECOVERY, INTIAL HOUR: Performed by: ORTHOPAEDIC SURGERY

## 2020-12-24 PROCEDURE — 63600175 PHARM REV CODE 636 W HCPCS: Performed by: NURSE ANESTHETIST, CERTIFIED REGISTERED

## 2020-12-24 PROCEDURE — 25000003 PHARM REV CODE 250: Performed by: NURSE ANESTHETIST, CERTIFIED REGISTERED

## 2020-12-24 PROCEDURE — 37000009 HC ANESTHESIA EA ADD 15 MINS: Performed by: ORTHOPAEDIC SURGERY

## 2020-12-24 PROCEDURE — 25000003 PHARM REV CODE 250: Performed by: ANESTHESIOLOGY

## 2020-12-24 DEVICE — IMPLANTABLE DEVICE: Type: IMPLANTABLE DEVICE | Site: ANKLE | Status: FUNCTIONAL

## 2020-12-24 RX ORDER — OXYCODONE AND ACETAMINOPHEN 7.5; 325 MG/1; MG/1
1 TABLET ORAL EVERY 4 HOURS PRN
Qty: 28 TABLET | Refills: 0 | Status: SHIPPED | OUTPATIENT
Start: 2020-12-24 | End: 2020-12-24 | Stop reason: HOSPADM

## 2020-12-24 RX ORDER — SODIUM CHLORIDE 0.9 % (FLUSH) 0.9 %
10 SYRINGE (ML) INJECTION
Status: DISCONTINUED | OUTPATIENT
Start: 2020-12-24 | End: 2020-12-24 | Stop reason: HOSPADM

## 2020-12-24 RX ORDER — KETAMINE HYDROCHLORIDE 50 MG/ML
INJECTION, SOLUTION INTRAMUSCULAR; INTRAVENOUS
Status: DISCONTINUED | OUTPATIENT
Start: 2020-12-24 | End: 2020-12-24

## 2020-12-24 RX ORDER — DEXAMETHASONE SODIUM PHOSPHATE 4 MG/ML
INJECTION, SOLUTION INTRA-ARTICULAR; INTRALESIONAL; INTRAMUSCULAR; INTRAVENOUS; SOFT TISSUE
Status: DISCONTINUED | OUTPATIENT
Start: 2020-12-24 | End: 2020-12-24

## 2020-12-24 RX ORDER — SUCCINYLCHOLINE CHLORIDE 20 MG/ML
INJECTION INTRAMUSCULAR; INTRAVENOUS
Status: DISCONTINUED | OUTPATIENT
Start: 2020-12-24 | End: 2020-12-24

## 2020-12-24 RX ORDER — FAMOTIDINE 10 MG/ML
INJECTION INTRAVENOUS
Status: DISCONTINUED | OUTPATIENT
Start: 2020-12-24 | End: 2020-12-24

## 2020-12-24 RX ORDER — CEFAZOLIN SODIUM 1 G/3ML
INJECTION, POWDER, FOR SOLUTION INTRAMUSCULAR; INTRAVENOUS
Status: DISCONTINUED | OUTPATIENT
Start: 2020-12-24 | End: 2020-12-24

## 2020-12-24 RX ORDER — MIDAZOLAM HYDROCHLORIDE 1 MG/ML
INJECTION INTRAMUSCULAR; INTRAVENOUS
Status: DISCONTINUED | OUTPATIENT
Start: 2020-12-24 | End: 2020-12-24

## 2020-12-24 RX ORDER — ONDANSETRON 2 MG/ML
INJECTION INTRAMUSCULAR; INTRAVENOUS
Status: DISCONTINUED | OUTPATIENT
Start: 2020-12-24 | End: 2020-12-24

## 2020-12-24 RX ORDER — OXYCODONE HYDROCHLORIDE 5 MG/1
15 TABLET ORAL
Status: DISCONTINUED | OUTPATIENT
Start: 2020-12-24 | End: 2020-12-24 | Stop reason: HOSPADM

## 2020-12-24 RX ORDER — MEPERIDINE HYDROCHLORIDE 50 MG/ML
12.5 INJECTION INTRAMUSCULAR; INTRAVENOUS; SUBCUTANEOUS EVERY 10 MIN PRN
Status: DISCONTINUED | OUTPATIENT
Start: 2020-12-24 | End: 2020-12-24 | Stop reason: HOSPADM

## 2020-12-24 RX ORDER — DIPHENHYDRAMINE HYDROCHLORIDE 50 MG/ML
12.5 INJECTION INTRAMUSCULAR; INTRAVENOUS
Status: DISCONTINUED | OUTPATIENT
Start: 2020-12-24 | End: 2020-12-24 | Stop reason: HOSPADM

## 2020-12-24 RX ORDER — LIDOCAINE HYDROCHLORIDE 10 MG/ML
INJECTION, SOLUTION EPIDURAL; INFILTRATION; INTRACAUDAL; PERINEURAL
Status: DISCONTINUED | OUTPATIENT
Start: 2020-12-24 | End: 2020-12-24

## 2020-12-24 RX ORDER — PROPOFOL 10 MG/ML
VIAL (ML) INTRAVENOUS
Status: DISCONTINUED | OUTPATIENT
Start: 2020-12-24 | End: 2020-12-24

## 2020-12-24 RX ORDER — ACETAMINOPHEN 10 MG/ML
INJECTION, SOLUTION INTRAVENOUS
Status: DISCONTINUED | OUTPATIENT
Start: 2020-12-24 | End: 2020-12-24

## 2020-12-24 RX ORDER — HYDROMORPHONE HYDROCHLORIDE 1 MG/ML
0.2 INJECTION, SOLUTION INTRAMUSCULAR; INTRAVENOUS; SUBCUTANEOUS
Status: DISCONTINUED | OUTPATIENT
Start: 2020-12-24 | End: 2020-12-24 | Stop reason: HOSPADM

## 2020-12-24 RX ORDER — SODIUM CHLORIDE, SODIUM LACTATE, POTASSIUM CHLORIDE, CALCIUM CHLORIDE 600; 310; 30; 20 MG/100ML; MG/100ML; MG/100ML; MG/100ML
INJECTION, SOLUTION INTRAVENOUS CONTINUOUS PRN
Status: DISCONTINUED | OUTPATIENT
Start: 2020-12-24 | End: 2020-12-24

## 2020-12-24 RX ORDER — ONDANSETRON 2 MG/ML
4 INJECTION INTRAMUSCULAR; INTRAVENOUS DAILY PRN
Status: DISCONTINUED | OUTPATIENT
Start: 2020-12-24 | End: 2020-12-24 | Stop reason: HOSPADM

## 2020-12-24 RX ORDER — FENTANYL CITRATE 50 UG/ML
INJECTION, SOLUTION INTRAMUSCULAR; INTRAVENOUS
Status: DISCONTINUED | OUTPATIENT
Start: 2020-12-24 | End: 2020-12-24

## 2020-12-24 RX ORDER — ROCURONIUM BROMIDE 10 MG/ML
INJECTION, SOLUTION INTRAVENOUS
Status: DISCONTINUED | OUTPATIENT
Start: 2020-12-24 | End: 2020-12-24

## 2020-12-24 RX ORDER — OXYCODONE HYDROCHLORIDE 5 MG/1
10 TABLET ORAL
Status: DISCONTINUED | OUTPATIENT
Start: 2020-12-24 | End: 2020-12-24

## 2020-12-24 RX ADMIN — PROPOFOL 200 MG: 10 INJECTION, EMULSION INTRAVENOUS at 09:12

## 2020-12-24 RX ADMIN — MIDAZOLAM HYDROCHLORIDE 2 MG: 1 INJECTION, SOLUTION INTRAMUSCULAR; INTRAVENOUS at 09:12

## 2020-12-24 RX ADMIN — OXYCODONE HYDROCHLORIDE 10 MG: 5 TABLET ORAL at 11:12

## 2020-12-24 RX ADMIN — FAMOTIDINE 20 MG: 10 INJECTION, SOLUTION INTRAVENOUS at 09:12

## 2020-12-24 RX ADMIN — HYDROMORPHONE HYDROCHLORIDE 0.2 MG: 1 INJECTION, SOLUTION INTRAMUSCULAR; INTRAVENOUS; SUBCUTANEOUS at 10:12

## 2020-12-24 RX ADMIN — MORPHINE SULFATE 2 MG: 2 INJECTION, SOLUTION INTRAMUSCULAR; INTRAVENOUS at 06:12

## 2020-12-24 RX ADMIN — FENTANYL CITRATE 100 MCG: 50 INJECTION INTRAMUSCULAR; INTRAVENOUS at 09:12

## 2020-12-24 RX ADMIN — DEXAMETHASONE SODIUM PHOSPHATE 8 MG: 4 INJECTION, SOLUTION INTRAMUSCULAR; INTRAVENOUS at 09:12

## 2020-12-24 RX ADMIN — ROCURONIUM BROMIDE 45 MG: 10 INJECTION, SOLUTION INTRAVENOUS at 09:12

## 2020-12-24 RX ADMIN — ONDANSETRON 4 MG: 2 INJECTION INTRAMUSCULAR; INTRAVENOUS at 09:12

## 2020-12-24 RX ADMIN — SODIUM CHLORIDE, SODIUM LACTATE, POTASSIUM CHLORIDE, AND CALCIUM CHLORIDE: .6; .31; .03; .02 INJECTION, SOLUTION INTRAVENOUS at 10:12

## 2020-12-24 RX ADMIN — KETAMINE HYDROCHLORIDE 50 MG: 50 INJECTION INTRAMUSCULAR; INTRAVENOUS at 09:12

## 2020-12-24 RX ADMIN — SUCCINYLCHOLINE CHLORIDE 160 MG: 20 INJECTION, SOLUTION INTRAMUSCULAR; INTRAVENOUS at 09:12

## 2020-12-24 RX ADMIN — ROCURONIUM BROMIDE 5 MG: 10 INJECTION, SOLUTION INTRAVENOUS at 09:12

## 2020-12-24 RX ADMIN — SUGAMMADEX 200 MG: 100 INJECTION, SOLUTION INTRAVENOUS at 10:12

## 2020-12-24 RX ADMIN — SODIUM CHLORIDE, SODIUM LACTATE, POTASSIUM CHLORIDE, AND CALCIUM CHLORIDE: .6; .31; .03; .02 INJECTION, SOLUTION INTRAVENOUS at 08:12

## 2020-12-24 RX ADMIN — LIDOCAINE HYDROCHLORIDE 100 MG: 10 INJECTION, SOLUTION EPIDURAL; INFILTRATION; INTRACAUDAL; PERINEURAL at 09:12

## 2020-12-24 RX ADMIN — HYDROCODONE BITARTRATE AND ACETAMINOPHEN 1 TABLET: 5; 325 TABLET ORAL at 03:12

## 2020-12-24 RX ADMIN — CEFAZOLIN 2 G: 330 INJECTION, POWDER, FOR SOLUTION INTRAMUSCULAR; INTRAVENOUS at 09:12

## 2020-12-24 RX ADMIN — ACETAMINOPHEN 1000 MG: 10 INJECTION, SOLUTION INTRAVENOUS at 09:12

## 2020-12-24 NOTE — H&P
[facility] History & Physical Examination   Patient Name: Jordan Mulligan  MRN: [id number]  Patient Class: OP- Observation   Admission Date: 12/23/2020  6:49 PM  Length of Stay: 0  Attending Physician: Dannie Cooper MD  Primary Care Provider: Saida Wilkins MD  Face-to-Face encounter date: 12/23/2020  Code Status: full code  Chief Complaint: Leg Pain (Leg pain r/t tib/fib fx on Monday 2/2 snowboarding. Was seen in the ER in [place]. LLE is cast at this time. Dr funk recommend admission sx in the morning. Denies any numbness/tingling to the extremity.)        Patient information was obtained from patient, past medical records and ER records.   HISTORY OF PRESENT ILLNESS:   History was obtained from the patient and ER physician Sign-out. Patient presents to the ED with the complaint of left leg pain. He injured his leg while snowboarding 3 days ago. He was seen at the urgent care in [place] who splinted his leg and instructed him to follow up with his local orthopedic. He contacted his orthopedic today who instructed him to come to the ED to be admitted for surgery tomorrow. The patient denies any other complaints at this time. He denies fever, chills, cough, chest pain, sob, nausea, vomiting, abdominal pain, numbness, tingling, weakness, or LOC.     I have reviewed the labs and imaging obtained in emergency room. Patient CBC was unremarkable. CMP was unremarkable.  I have reviewed the EKG and does not show new ischemic changes. No concerning finding on chest x ray. The patient is treated with analgesics in the ED.     Decision to admit was taken and patient was informed about the plan of care.   REVIEW OF SYSTEMS:   10 Point Review of System was performed and was found to be negative except for that mentioned already in the HPI above.     PAST MEDICAL HISTORY:   No past medical history on file.    PAST SURGICAL HISTORY:     Past Surgical History:   Procedure Laterality Date     "ANKLE SURGERY Left 2014       ALLERGIES:   Codeine    FAMILY HISTORY:     Family History   Problem Relation Age of Onset    No Known Problems Mother     No Known Problems Father     No Known Problems Sister     No Known Problems Brother        SOCIAL HISTORY:     Social History     Tobacco Use    Smoking status: Never Smoker    Smokeless tobacco: Current User     Types: Chew   Substance Use Topics    Alcohol use: Yes     Comment: very rare        Social History     Substance and Sexual Activity   Sexual Activity Not on file        HOME MEDICATIONS:     Prior to Admission medications    Medication Sig Start Date End Date Taking? Authorizing Provider   ALPRAZolam (XANAX) 1 MG tablet Take 1 tablet by mouth 3 (three) times daily as needed. 6/27/20   Historical Provider   dextroamphetamine-amphetamine (ADDERALL XR) 30 MG 24 hr capsule Take 1 capsule by mouth 2 (two) times a day.  7/30/20   Historical Provider   valACYclovir (VALTREX) 1000 MG tablet Take 1 tablet by mouth 2 (two) times a day. 7/31/20   Historical Provider         PHYSICAL EXAM:   /70   Pulse (!) 56   Temp 99.1 °F (37.3 °C) (Oral)   Resp 18   Ht 5' 7" (1.702 m)   Wt 78 kg (172 lb)   SpO2 98%   BMI 26.94 kg/m²   Vitals Reviewed  General appearance: Well-developed, well-nourished,  White male .  Skin: No Rash. Warm, dry.  Neuro: AAOx3.  Motor and sensory exams grossly intact. Good tone. Power in all 4 extremities 5/5.   HENT: Atraumatic head. Moist mucous membranes of oral cavity.  Eyes: Normal extraocular movements. PERRLA  Neck: Supple. No evidence of lymphadenopathy. No thyroidomegaly.  Lungs: Clear to auscultation bilaterally. No wheezing present.   Heart: Regular rate and rhythm. S1 and S2 present with no murmurs/gallop/rub. No pedal edema. No JVD present.   Abdomen: Soft, non-distended, non-tender. No rebound tenderness/guarding. No masses or organomegaly. Bowel sounds are normal. Bladder is not palpable.   Extremities: No cyanosis, " clubbing. Limited ROM to Left leg. Splint in place. Pedal pulses intact, +2. Capillary refill less than 2 seconds.   Psych/mental status: Mood and affect appropriate. Cooperative. Responds appropriately to questions.   EMERGENCY DEPARTMENT LABS AND IMAGING:     Labs Reviewed   COMPREHENSIVE METABOLIC PANEL - Abnormal; Notable for the following components:       Result Value    Alkaline Phosphatase 53 (*)     All other components within normal limits   CBC W/ AUTO DIFFERENTIAL   APTT   PROTIME-INR   SARS-COV-2 RNA AMPLIFICATION, QUAL   URINALYSIS   TYPE & SCREEN       X-Ray Tibia Fibula 2 View Left    (Results Pending)   X-Ray Chest AP Portable    (Results Pending)       ASSESSMENT & PLAN:   Left ankle fracture  Left leg pain  Anxiety    Admit to Med-Surge  PRN analgesics  NPO after midnight  Consult Ortho; plans for OR in AM  Continue home Xanax prn        DVT Prophylaxis: Mechanical DVT prophylaxis and will be advised to be as mobile as possible and sit in a chair as tolerated.   ________________________________________________________________________________    Discharge Planning and Disposition:  Patient will be discharged in 1-2 days  Face-to-Face encounter date: 12/23/2020  Encounter included review of the medical records, interviewing and examining the patient face-to-face, discussion with other health care providers including emergency medicine physician, admission orders, interpreting lab/test results and formulating a plan of care.   Medical Decision Making during this encounter was  [_] Low Complexity  [x] Moderate Complexity  [ ] High Complexity  _________________________________________________________________________________    INPATIENT LIST OF MEDICATIONS   No current facility-administered medications for this encounter.     Current Outpatient Medications:     ALPRAZolam (XANAX) 1 MG tablet, Take 1 tablet by mouth 3 (three) times daily as needed., Disp: , Rfl:     dextroamphetamine-amphetamine  (ADDERALL XR) 30 MG 24 hr capsule, Take 1 capsule by mouth 2 (two) times a day. , Disp: , Rfl:     valACYclovir (VALTREX) 1000 MG tablet, Take 1 tablet by mouth 2 (two) times a day., Disp: , Rfl:       Scheduled Meds:  Continuous Infusions:  PRN Meds:.      Mona Blandon  [facility] Hospitalist  12/23/2020

## 2020-12-24 NOTE — PLAN OF CARE
Problem: Adult Inpatient Plan of Care  Goal: Plan of Care Review  Outcome: Met  Goal: Patient-Specific Goal (Individualization)  Outcome: Met  Goal: Absence of Hospital-Acquired Illness or Injury  Outcome: Met  Goal: Optimal Comfort and Wellbeing  Outcome: Met  Goal: Readiness for Transition of Care  Outcome: Met  Goal: Rounds/Family Conference  Outcome: Met     Problem: Fall Injury Risk  Goal: Absence of Fall and Fall-Related Injury  Outcome: Met     Problem: Pain Acute  Goal: Optimal Pain Control  Outcome: Met

## 2020-12-24 NOTE — PLAN OF CARE
Chart/Discharge orders reviewed. Discharge home with no needs.      12/24/20 1301   Discharge Assessment   Assessment Type Final Discharge Note   Assessment information obtained from? Medical Record   Prior to hospitilization cognitive status: Alert/Oriented   Prior to hospitalization functional status: Independent   Current cognitive status: Alert/Oriented   Current Functional Status: Assistive Equipment   Discharge Plan A Home   Discharge Plan B Home   DME Needed Upon Discharge  crutches

## 2020-12-24 NOTE — ANESTHESIA PROCEDURE NOTES
Intubation  Performed by: Didier Chopra CRNA  Authorized by: Bakari Chavez MD     Intubation:     Induction:  Intravenous    Intubated:  Postinduction    Attempts:  1    Attempted By:  CRNA    Method of Intubation:  Direct    Blade:  Chisholm 2    Laryngeal View Grade: Grade I - full view of chords      Difficult Airway Encountered?: No      Complications:  None    Airway Device:  Oral endotracheal tube    Airway Device Size:  7.5    Style/Cuff Inflation:  Cuffed    Inflation Amount (mL):  5    Tube secured:  22    Secured at:  The lips    Placement Verified By:  Capnometry    Complicating Factors:  None    Findings Post-Intubation:  BS equal bilateral and atraumatic/condition of teeth unchanged

## 2020-12-24 NOTE — DISCHARGE SUMMARY
[facility]  Discharge Summary      Patient Name: Jordan Mulligan  MRN: [id number]  Patient Class: OP- Observation  Admission Date: 12/23/2020  Hospital Length of Stay: 0 days  Discharge Date and Time:  12/24/2020 12:11 PM  Attending Physician: Toni Manzanares MD   Discharging Provider: Toni Manzanares MD  Primary Care Provider: Saida Wilkins MD    HPI:   History was obtained from the patient and ER physician Sign-out. Patient presents to the ED with the complaint of left leg pain. He injured his leg while snowboarding 3 days ago. He was seen at the urgent care in [place] who splinted his leg and instructed him to follow up with his local orthopedic. He contacted his orthopedic today who instructed him to come to the ED to be admitted for surgery tomorrow. The patient denies any other complaints at this time. He denies fever, chills, cough, chest pain, sob, nausea, vomiting, abdominal pain, numbness, tingling, weakness, or LOC. I have reviewed the labs and imaging obtained in emergency room. Patient CBC was unremarkable. CMP was unremarkable.  I have reviewed the EKG and does not show new ischemic changes. No concerning finding on chest x ray. The patient is treated with analgesics in the ED. Decision to admit was taken and patient was informed about the plan of care.     Procedure(s) (LRB):  ORIF, ANKLE (Left)      Hospital Course:   The patient was admitted to the hospital for workup and treatment including pain control measures.  Orthopedic surgery consultation was obtained.  The patient underwent ORIF.  Postoperatively the patient had controlled pain and was able to mobilize with crutches.  The patient is medically stable for discharge home with outpatient follow-up with orthopedic surgery.  Oral pain medications and nausea medications prescribed.  The patient is in understanding and agreement with the discharge plan.    Consults:   Consults (From admission, onward)       "  Status Ordering Provider     Inpatient consult to Hospitalist  Once     Provider:  Robert Blandon NP    Acknowledged MONTRELL LIN     Inpatient consult to Orthopedics  Once     Provider:  Jose Cortes MD    Acknowledged ROBERT BLANDON        Discharge diagnoses:  Left bimalleolar ankle fracture status post ORIF  Final Active Diagnoses:    Diagnosis Date Noted POA    PRINCIPAL PROBLEM:  Leg fracture [S82.90XA] 12/23/2020 Yes     Discharge physical exam:  Appears comfortable, nontoxic, no apparent distress  Comfortable work of breathing, lungs are clear  2+ radial pulses, regular rate and rhythm  Left distal lower extremity postoperative dressing    Discharged Condition: stable    Disposition: Home or Self Care    Follow Up:  Follow-up Information     Saida Wilkins MD In 2 weeks.    Specialty: Internal Medicine  Contact information:  [address]  [place] [address]  [phone]             Jose Cortes MD In 1 week.    Specialties: Orthopedic Surgery, Surgery, Sports Medicine  Contact information:  [address]  [address]  [place] [address]  [phone]                 Patient Instructions:      CRUTCHES FOR HOME USE     Order Specific Question Answer Comments   Type: Axillary    Height: 5' 7" (1.702 m)    Weight: 78 kg (172 lb)    Length of need (1-99 months): 3      Diet Adult Regular     Notify your health care provider if you experience any of the following:  temperature >100.4     Notify your health care provider if you experience any of the following:  severe uncontrolled pain     Notify your health care provider if you experience any of the following:  persistent nausea and vomiting or diarrhea     Notify your health care provider if you experience any of the following:  redness, tenderness, or signs of infection (pain, swelling, redness, odor or green/yellow discharge around incision site)     Leave dressing on - Keep it clean, dry, and intact until clinic visit     Weight bearing " restrictions (specify):   Order Comments: Nonweightbearing left lower extremity, use crutches for ambulation  Keep left lower extremity clean and dry       Pending Diagnostic Studies:     None         Medications:  Reconciled Home Medications:      Medication List      START taking these medications    ondansetron 4 MG tablet  Commonly known as: ZOFRAN  Take 1 tablet by mouth every 6 hours as needed for nausea     oxyCODONE 15 MG Tab  Commonly known as: ROXICODONE  Take 1 tablet by mouth every 4 hours as needed for post op pain.        CONTINUE taking these medications    ALPRAZolam 1 MG tablet  Commonly known as: XANAX  Take 1 tablet by mouth 3 (three) times daily as needed.     dextroamphetamine-amphetamine 30 MG 24 hr capsule  Commonly known as: ADDERALL XR  Take 1 capsule by mouth 2 (two) times a day.     valACYclovir 1000 MG tablet  Commonly known as: VALTREX  Take 1 tablet by mouth 2 (two) times a day.            Indwelling Lines/Drains at time of discharge:   Lines/Drains/Airways     None                 Time spent on the discharge of patient: 32 minutes  Patient was seen and examined on the date of discharge and determined to be suitable for discharge.         Toni Manzanares MD  Department of Hospital Medicine  [facility]

## 2020-12-24 NOTE — NURSING
Discharged to home as per MD order. Prescriptions and DC orders explained and given to patient. Brought out in  by nurse extender and released to care of friend without problems.

## 2020-12-24 NOTE — ANESTHESIA POSTPROCEDURE EVALUATION
Anesthesia Post Evaluation    Patient: Jordan Mulligan    Procedure(s) Performed: Procedure(s) (LRB):  ORIF, ANKLE (Left)    Final Anesthesia Type: general      Patient location during evaluation: PACU  Patient participation: Yes- Able to Participate  Level of consciousness: awake and alert, oriented and awake  Post-procedure vital signs: reviewed and stable  Pain management: adequate  Airway patency: patent    PONV status at discharge: No PONV  Anesthetic complications: no      Cardiovascular status: blood pressure returned to baseline, hemodynamically stable and stable  Respiratory status: unassisted, spontaneous ventilation and room air  Hydration status: euvolemic  Follow-up not needed.          Vitals Value Taken Time   /86 12/24/20 1115   Temp 36.7 °C (98 °F) 12/24/20 1045   Pulse 57 12/24/20 1119   Resp 20 12/24/20 1119   SpO2 95 % 12/24/20 1119   Vitals shown include unvalidated device data.      No case tracking events are documented in the log.      Pain/Khris Score: Pain Rating Prior to Med Admin: 3 (12/24/2020 11:03 AM)  Khris Score: 8 (12/24/2020 10:45 AM)

## 2020-12-24 NOTE — ED PROVIDER NOTES
Encounter Date: 12/23/2020       History     Chief Complaint   Patient presents with    Leg Pain     Leg pain r/t tib/fib fx on Monday 2/2 snowboarding. Was seen in the ER in [place]. LLE is cast at this time. Dr funk recommend admission sx in the morning. Denies any numbness/tingling to the extremity.     This is a 45-year-old male who injured his legs snowboarding 3 days ago.  He called his orthopedic doctor Dr. Funk and was instructed to come to the emergency room for admission with surgery to be scheduled tomorrow morning.  The patient denies any other injuries or complaints at that time.  He has been in a left lower extremity short-leg splint.  Denies any extremity weakness numbness tingling or paresthesias.  He denies any other problems or complaints.        Review of patient's allergies indicates:   Allergen Reactions    Codeine      Patient was told so he has stayed away from it     No past medical history on file.  Past Surgical History:   Procedure Laterality Date    ANKLE SURGERY Left 2014     Family History   Problem Relation Age of Onset    No Known Problems Mother     No Known Problems Father     No Known Problems Sister     No Known Problems Brother      Social History     Tobacco Use    Smoking status: Never Smoker    Smokeless tobacco: Current User     Types: Chew   Substance Use Topics    Alcohol use: Yes     Comment: very rare    Drug use: No     Review of Systems   Constitutional: Negative.  Negative for activity change, appetite change, chills, diaphoresis, fatigue, fever and unexpected weight change.   HENT: Negative for congestion, dental problem, ear pain and nosebleeds.    Eyes: Negative for pain.   Respiratory: Negative for cough, chest tightness and shortness of breath.    Cardiovascular: Negative.  Negative for chest pain, palpitations and leg swelling.   Gastrointestinal: Negative for abdominal distention, abdominal pain, anal bleeding, blood in stool, constipation,  diarrhea, nausea and vomiting.   Endocrine: Negative.    Genitourinary: Negative for decreased urine volume, difficulty urinating, dysuria, flank pain, frequency, testicular pain and urgency.   Musculoskeletal: Positive for arthralgias and myalgias. Negative for back pain, gait problem, joint swelling, neck pain and neck stiffness.        Left lower leg pain only   Skin: Negative.  Negative for color change, pallor and rash.   Neurological: Negative for seizures, syncope, facial asymmetry, speech difficulty, weakness, light-headedness, numbness and headaches.   Hematological: Negative.  Does not bruise/bleed easily.   Psychiatric/Behavioral: Negative.  Negative for confusion.   All other systems reviewed and are negative.      Physical Exam     Initial Vitals [12/23/20 1846]   BP Pulse Resp Temp SpO2   (!) 140/75 (!) 57 20 99.1 °F (37.3 °C) 98 %      MAP       --         Physical Exam    Nursing note and vitals reviewed.  Constitutional: He appears well-developed and well-nourished. He is active.  Non-toxic appearance. He does not have a sickly appearance. He does not appear ill. No distress.   HENT:   Head: Normocephalic and atraumatic.   Nose: Nose normal.   Mouth/Throat: Oropharynx is clear and moist.   Eyes: Conjunctivae, EOM and lids are normal. Pupils are equal, round, and reactive to light. Right eye exhibits no discharge. Left eye exhibits no discharge.   Neck: Normal range of motion and full passive range of motion without pain. Neck supple. No thyromegaly present. No spinous process tenderness and no muscular tenderness present. No tracheal deviation, no edema, no erythema and normal range of motion present. No neck rigidity. No JVD present.   Cardiovascular: Normal rate, regular rhythm, normal heart sounds, intact distal pulses and normal pulses. Exam reveals no gallop and no friction rub.    No murmur heard.  Pulmonary/Chest: Effort normal and breath sounds normal. No stridor. No respiratory distress. He  has no wheezes. He has no rhonchi. He has no rales.   Abdominal: Soft. Normal appearance and bowel sounds are normal. He exhibits no distension and no mass. There is no abdominal tenderness. There is no rebound and no guarding. No hernia.   Musculoskeletal: Tenderness present.        Left ankle: He exhibits decreased range of motion, swelling and ecchymosis. Tenderness. Lateral malleolus and medial malleolus tenderness found.      Cervical back: Normal. He exhibits normal range of motion, no tenderness, no bony tenderness and no swelling.      Thoracic back: He exhibits normal range of motion, no tenderness, no bony tenderness and no swelling.      Lumbar back: Normal. He exhibits normal range of motion, no tenderness, no bony tenderness, no swelling and no edema.      Left lower leg: He exhibits tenderness, bony tenderness and swelling. Edema present.      Comments: Pulses are 2+ throughout, cap refill is less than 2 sec throughout, no edema noted, extremities are otherwise nontender throughout with full range of motion  Splint placed on left lower leg.  Splint removed, no edema blisters.  Skin is intact.  There is no tenting.  Ecchymosis noted.  Tenderness palpation to the bilateral malleolar area and lower leg.  No erythema, no increased warmth.  Left lower extremity is neurovascularly intact throughout.  Sensation is normal, motor functions normal, dorsalis pedis is 2+ and cap refill is less than 2 seconds throughout all digits.   Lymphadenopathy:     He has no cervical adenopathy.   Neurological: He is alert and oriented to person, place, and time. He has normal strength. No cranial nerve deficit or sensory deficit. Coordination normal.   Skin: Skin is warm and dry. Capillary refill takes less than 2 seconds. No ecchymosis, no petechiae and no rash noted. No cyanosis or erythema. No pallor.   Psychiatric: He has a normal mood and affect. His speech is normal and behavior is normal. Judgment and thought content  normal. Cognition and memory are normal.         ED Course   Procedures  Labs Reviewed   COMPREHENSIVE METABOLIC PANEL - Abnormal; Notable for the following components:       Result Value    Alkaline Phosphatase 53 (*)     All other components within normal limits   CBC W/ AUTO DIFFERENTIAL   APTT   PROTIME-INR   SARS-COV-2 RNA AMPLIFICATION, QUAL   URINALYSIS   TYPE & SCREEN          Imaging Results          X-Ray Chest AP Portable (In process)                X-Ray Tibia Fibula 2 View Left (In process)                  Medical Decision Making:   Clinical Tests:   Lab Tests: Reviewed  Radiological Study: Reviewed  ED Management:  I did discuss the case with Dr. Cortes.  He requests the hospitalist admit and he will operate tomorrow.  The patient is neurologically normal and hemodynamically stable.  Splint has been replaced and he is neurovascularly intact.  The patient has been evaluated in the emergency room by the hospitalist service who will admit.                             Clinical Impression:       ICD-10-CM ICD-9-CM   1. Leg fracture  S82.90XA 827.0   2. Trauma  T14.90XA 959.9                          ED Disposition Condition    Observation                             Celeste Vieyra MD  12/23/20 8483

## 2020-12-24 NOTE — CARE UPDATE
12/24/20 1154   PRE-TX-O2   O2 Device (Oxygen Therapy) room air   SpO2 97 %  (dc)   Pulse Oximetry Type Intermittent   $ Pulse Oximetry - Multiple Charge Pulse Oximetry - Multiple

## 2020-12-24 NOTE — ANESTHESIA PREPROCEDURE EVALUATION
12/24/2020  Jordan Mulligan is a 45 y.o., male.    Anesthesia Evaluation    I have reviewed the Patient Summary Reports.    I have reviewed the Nursing Notes.    I have reviewed the Medications.     Review of Systems  Anesthesia Hx:  No problems with previous Anesthesia  Neg history of prior surgery. Denies Family Hx of Anesthesia complications.   Denies Personal Hx of Anesthesia complications.   Social:  Non-Smoker, Alcohol Use    Hematology/Oncology:  Hematology Normal   Oncology Normal     EENT/Dental:EENT/Dental Normal   Cardiovascular:  Cardiovascular Normal     Pulmonary:  Pulmonary Normal    Renal/:  Renal/ Normal     Hepatic/GI:  Hepatic/GI Normal    Musculoskeletal:  Musculoskeletal Normal    Neurological:  Neurology Normal    Endocrine:  Endocrine Normal    Dermatological:  Skin Normal    Psych:  Psychiatric Normal           Patient Active Problem List   Diagnosis    Fracture of talus    Leg fracture       Past Surgical History:   Procedure Laterality Date    ANKLE SURGERY Left 2014        Tobacco Use:  The patient  reports that he has never smoked. His smokeless tobacco use includes chew.     No results found for this or any previous visit.     Imaging Results          X-Ray Chest AP Portable (Final result)  Result time 12/23/20 20:35:02    Final result by Sandra Valentino MD (12/23/20 20:35:02)                 Narrative:    PROCEDURE:   XR CHEST AP PORTABLE  dated  12/23/2020 8:18 PM    CLINICAL HISTORY:   Male 45 years of age.   leg fracture    TECHNIQUE: AP view of the chest obtained portably at 8:18 PM.    PREVIOUS STUDIES:  None Available    FINDINGS:    Cardiac and mediastinal contours are normal. Lungs are clear. There is  no pleural effusion or pneumothorax. Bones are unremarkable.      IMPRESSION:    Normal.    Electronically Signed by Sandra Valentino on 12/24/2020 6:20 AM                              X-Ray Tibia Fibula 2 View Left (Final result)  Result time 12/23/20 20:34:01    Final result by Sandra Valentino MD (12/23/20 20:34:01)                 Narrative:    PROCEDURE:    XR TIBIA FIBULA 2 VIEW LEFT  dated  12/23/2020 8:18 PM    CLINICAL HISTORY:   Male 45 years of age.    TECHNIQUE:  2 views left tibia/fibula    PREVIOUS STUDIES:  None Available    FINDINGS:    Medial malleolus fracture is displaced distally by 4 mm and laterally  by 4 mm. The talus is mildly Externally rotated and laterally  displaced relative to the tibia.    There is a comminuted fracture of the distal fibula above the  syndesmosis, 5 cm proximal to the tibial plafond. A butterfly fragment  is mildly rotated and laterally this place. The dominant fracture  plane is spiral, and the dominant distal fragment is medially  displaced by 3 mm and mildly angulated in a valgus direction.    The posterior tibial malleolus is intact.    Corticated ossific densities adjacent to the lateral malleolus are not  acute fractures.      IMPRESSION:    There are fractures of the medial malleolus and the fibula above the  syndesmosis. Presumably, the interosseous membrane has ruptured at the  level of the fibular fracture. The talus is laterally displaced and  externally rotated. Although there is no fracture of the posterior  malleolus, based on the the displacement and rotation of the talus,  there is suspicion for rupture of the posterior syndesmosis.  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    Electronically Signed by Sandra Valentino on 12/24/2020 6:19 AM                               Lab Results   Component Value Date    WBC 6.89 12/24/2020    HGB 15.5 12/24/2020    HCT 45.8 12/24/2020    MCV 94 12/24/2020     12/24/2020     BMP  Lab Results   Component Value Date     12/24/2020    K 4.0 12/24/2020     12/24/2020    CO2 25 12/24/2020    BUN 13 12/24/2020    CREATININE 1.0 12/24/2020    CALCIUM 8.6 (L)  12/24/2020    ANIONGAP 8 12/24/2020    GLU 93 12/24/2020     12/23/2020       No results found for this or any previous visit.        Physical Exam  General:  Well nourished    Airway/Jaw/Neck:  Airway Findings: Mouth Opening: Normal Tongue: Normal  General Airway Assessment: Adult  Mallampati: II  Improves to II with phonation.  TM Distance: Normal, at least 6 cm  Jaw/Neck Findings:  Neck ROM: Normal ROM       Chest/Lungs:  Chest/Lungs Findings: Clear to auscultation, Normal Respiratory Rate     Heart/Vascular:  Heart Findings: Rate: Normal  Rhythm: Regular Rhythm  Sounds: Normal        Mental Status:  Mental Status Findings:  Cooperative, Alert and Oriented         Anesthesia Plan  Type of Anesthesia, risks & benefits discussed:  Anesthesia Type:  general  Patient's Preference:   Intra-op Monitoring Plan: standard ASA monitors  Intra-op Monitoring Plan Comments:   Post Op Pain Control Plan: multimodal analgesia  Post Op Pain Control Plan Comments:   Induction:   IV  Beta Blocker:  Patient is not currently on a Beta-Blocker (No further documentation required).       Informed Consent: Patient understands risks and agrees with Anesthesia plan.  Questions answered. Anesthesia consent signed with patient.  ASA Score: 2     Day of Surgery Review of History & Physical:    H&P update referred to the surgeon.     Anesthesia Plan Notes: Ofirmev 1 gm IV  Zofran, Decadron 8 mg IV  Ketamine        Ready For Surgery From Anesthesia Perspective.

## 2020-12-24 NOTE — TRANSFER OF CARE
"Anesthesia Transfer of Care Note    Patient: Jordan Mulligan    Procedure(s) Performed: Procedure(s) (LRB):  ORIF, ANKLE (Left)    Patient location: PACU    Anesthesia Type: general    Transport from OR: Transported from OR on 2-3 L/min O2 by NC with adequate spontaneous ventilation    Post pain: adequate analgesia    Post assessment: no apparent anesthetic complications    Post vital signs: stable    Level of consciousness: awake    Nausea/Vomiting: no nausea/vomiting    Complications: none    Transfer of care protocol was followed      Last vitals:   Visit Vitals  /64 (BP Location: Right arm, Patient Position: Lying)   Pulse (!) 53   Temp 36.9 °C (98.4 °F) (Oral)   Resp 17   Ht 5' 7" (1.702 m)   Wt 78 kg (172 lb)   SpO2 96%   BMI 26.94 kg/m²     "

## 2020-12-24 NOTE — OP NOTE
[facility]  Surgery Department  Operative Note    SUMMARY     Date of Procedure: 12/24/2020     Procedure:  Open reduction internal fixation of left bimalleolar ankle fracture closed    Surgeon(s) and Role:     * Jose Cortes MD - Primary    Assisting Surgeon:  Rayne    Pre-Operative Diagnosis:  Bimalleolar ankle fracture left  Post-Operative Diagnosis:  Bimalleolar ankle fracture left    Anesthesia: General    Description of the Findings of the Procedure:  Patient was placed on the operating table in supine position.  He was prepped and draped in the usual sterile manner for surgery.  An incision was made directly over the medial malleolus.  It was carried down sharply through the skin.  The fracture was easily identified and irrigated out.  The joint was irrigated out.  I now secured the medial malleolus using 2 guidewires.  C-arm confirmed we were in ideal position.  I over drilled the guidewires and placed 2   4.0 mm screws across the fracture.  We had anatomic reduction.  We had excellent compression.    Attention was now turned to the fibula.  An incision was made directly over the fibula.  The fibula was exposed subperiosteal E.  There were multiple fragments at the fracture but the 2 major fragments could be anatomically aligned.  I then placed a 7 hole plate across the fracture and secured it with 3 screws on each side.  We obtained an outstanding bite and we had an anatomic reduction.  We copiously irrigated.  The mortise was anatomically reduced and confirmed on C-arm.  The hardware was in perfect position.  We closed the subcu with 2 0 Vicryl.  We closed the skin with 4-0 Monocryl.  Sterile dressings were applied as was a posterior splint.  The patient was noted to be in stable condition    Complications: No    Estimated Blood Loss (EBL): * No values recorded between 12/24/2020  9:31 AM and 12/24/2020 10:15 AM *           Implants:   Implant Name Type Inv. Item Serial No.   Lot No. LRB No. Used Action   LOG [id number] - [employer]TH[employer] 3.0 CANNULATED SCREW SET - 1           LOG [id number] - [employer] 3.5 CANNULATED SCREW SET - 1           LOG [id number] - [employer] 4.0 CANNULATED SCREW SET - 1           LOG [id number] - SYNTHES SMALL FRAGMENT - 1           Screw    [employer]ES  Left 3 Implanted   Screw      Left 2 Implanted   Screw      Left 1 Implanted   Screw      Left 1 Implanted   Screw      Left 1 Implanted   Plate      Left 1 Implanted       Specimens:   Specimen (12h ago, onward)    None                  Condition: Good    Disposition: PACU - hemodynamically stable.              Discharge Note    SUMMARY     Admit Date: 12/23/2020    Discharge Date and Time:  12/24/2020 10:15 AM    Hospital Course (synopsis of major diagnoses, care, treatment, and services provided during the course of the hospital stay): Uneventful      Final Diagnosis: Post-Op Diagnosis Codes:     * Type I or II open fracture of lower extremity, unspecified laterality, sequela [S82.90XS]    Disposition: Home or Self Care    Follow Up/Patient Instructions:     Medications:  Reconciled Home Medications:   Current Discharge Medication List      CONTINUE these medications which have NOT CHANGED    Details   ALPRAZolam (XANAX) 1 MG tablet Take 1 tablet by mouth 3 (three) times daily as needed.      dextroamphetamine-amphetamine (ADDERALL XR) 30 MG 24 hr capsule Take 1 capsule by mouth 2 (two) times a day.       valACYclovir (VALTREX) 1000 MG tablet Take 1 tablet by mouth 2 (two) times a day.           No discharge procedures on file.

## 2020-12-24 NOTE — TRANSFER OF CARE
"Anesthesia Transfer of Care Note    Patient: Jordan Mulligan    Procedure(s) Performed: Procedure(s) (LRB):  ORIF, ANKLE (Left)    Anesthesia PACU Handoff    Last vitals:   Visit Vitals  /64 (BP Location: Right arm, Patient Position: Lying)   Pulse (!) 53   Temp 36.9 °C (98.4 °F) (Oral)   Resp 17   Ht 5' 7" (1.702 m)   Wt 78 kg (172 lb)   SpO2 96%   BMI 26.94 kg/m²     "

## 2020-12-30 ENCOUNTER — OFFICE VISIT (OUTPATIENT)
Dept: ORTHOPEDICS | Facility: CLINIC | Age: 45
End: 2020-12-30
Payer: COMMERCIAL

## 2020-12-30 VITALS
WEIGHT: 172 LBS | BODY MASS INDEX: 27 KG/M2 | SYSTOLIC BLOOD PRESSURE: 136 MMHG | DIASTOLIC BLOOD PRESSURE: 74 MMHG | HEART RATE: 73 BPM | HEIGHT: 67 IN

## 2020-12-30 DIAGNOSIS — Z87.81 S/P ORIF (OPEN REDUCTION INTERNAL FIXATION) FRACTURE: Primary | ICD-10-CM

## 2020-12-30 DIAGNOSIS — Z98.890 S/P ORIF (OPEN REDUCTION INTERNAL FIXATION) FRACTURE: Primary | ICD-10-CM

## 2020-12-30 PROCEDURE — 99024 PR POST-OP FOLLOW-UP VISIT: ICD-10-PCS | Mod: S$GLB,,, | Performed by: PHYSICIAN ASSISTANT

## 2020-12-30 PROCEDURE — 99024 POSTOP FOLLOW-UP VISIT: CPT | Mod: S$GLB,,, | Performed by: PHYSICIAN ASSISTANT

## 2020-12-30 PROCEDURE — 1125F AMNT PAIN NOTED PAIN PRSNT: CPT | Mod: S$GLB,,, | Performed by: PHYSICIAN ASSISTANT

## 2020-12-30 PROCEDURE — 3008F PR BODY MASS INDEX (BMI) DOCUMENTED: ICD-10-PCS | Mod: S$GLB,,, | Performed by: PHYSICIAN ASSISTANT

## 2020-12-30 PROCEDURE — 3008F BODY MASS INDEX DOCD: CPT | Mod: S$GLB,,, | Performed by: PHYSICIAN ASSISTANT

## 2020-12-30 PROCEDURE — 1125F PR PAIN SEVERITY QUANTIFIED, PAIN PRESENT: ICD-10-PCS | Mod: S$GLB,,, | Performed by: PHYSICIAN ASSISTANT

## 2020-12-30 NOTE — PROGRESS NOTES
Subjective:    Patient ID: Jordan Mulligan is a 45 y.o. male.    Chief Complaint: Post-op Evaluation of the Left Ankle (S/p ORIF left ankle on 12/24/20, patient fell on 12/30/20 and hit his left foot, he states his big toe was numb for a few hours, pain was worse for a day but it has subsided)      History of Present Illness  Patient is here status post left ankle over if done on 12/24/2020.  Patient states that he wiped out yesterday and landed on his left foot.  Has some increased pain and swelling.    Current Medications  Current Outpatient Medications   Medication Sig Dispense Refill    ALPRAZolam (XANAX) 1 MG tablet Take 1 tablet by mouth 3 (three) times daily as needed.      dextroamphetamine-amphetamine (ADDERALL XR) 30 MG 24 hr capsule Take 1 capsule by mouth 2 (two) times a day.       ondansetron (ZOFRAN) 4 MG tablet Take 1 tablet by mouth every 6 hours as needed for nausea 30 tablet 0    oxyCODONE (ROXICODONE) 15 MG Tab Take 1 tablet by mouth every 4 hours as needed for post op pain. 42 tablet 0    valACYclovir (VALTREX) 1000 MG tablet Take 1 tablet by mouth 2 (two) times a day.       No current facility-administered medications for this visit.        Allergies  Review of patient's allergies indicates:   Allergen Reactions    Codeine      Patient was told so he has stayed away from it       Past Medical History  No past medical history on file.    Surgical History  Past Surgical History:   Procedure Laterality Date    ANKLE SURGERY Left 2014    OPEN REDUCTION AND INTERNAL FIXATION (ORIF) OF INJURY OF ANKLE Left 12/24/2020    Procedure: ORIF, ANKLE;  Surgeon: Jose Cortes MD;  Location: [facility];  Service: Orthopedics;  Laterality: Left;       Family History:   Family History   Problem Relation Age of Onset    No Known Problems Mother     No Known Problems Father     No Known Problems Sister     No Known Problems Brother        Social History:   Social History     Socioeconomic History     Marital status:      Spouse name: Not on file    Number of children: Not on file    Years of education: Not on file    Highest education level: Not on file   Occupational History    Not on file   Social Needs    Financial resource strain: Not on file    Food insecurity     Worry: Not on file     Inability: Not on file    Transportation needs     Medical: Not on file     Non-medical: Not on file   Tobacco Use    Smoking status: Never Smoker    Smokeless tobacco: Current User     Types: Chew   Substance and Sexual Activity    Alcohol use: Yes     Comment: very rare    Drug use: No    Sexual activity: Not on file   Lifestyle    Physical activity     Days per week: Not on file     Minutes per session: Not on file    Stress: Not on file   Relationships    Social connections     Talks on phone: Not on file     Gets together: Not on file     Attends [employer] service: Not on file     Active member of club or organization: Not on file     Attends meetings of clubs or organizations: Not on file     Relationship status: Not on file   Other Topics Concern    Not on file   Social History Narrative    Not on file       Date of surgery:  12/24/2020    Review of Systems     General/Constitutional: Chills denies. Fatigue denies. Fever denies. Weight gain denies. Weight loss denies.    Musculoskeletal: Comments: See HPI for details    Skin: Rash denies.    Objective:   Vital Signs:   Vitals:    12/30/20 1158   BP: 136/74   Pulse: 73        Physical Exam    This a well-developed, well nourished patient in no acute distress.  They are alert and oriented and cooperative to examination.  Pt. walks without an antalgic gait.      General Examination:     Constitutional: The patient is alert and oriented to lace person and time. Mood is pleasant.     Head/Face: Normal facial features normal eyebrows    Eyes: Normal extraocular motion bilaterally    Lungs: Respirations are equal and unlabored    Gait is  coordinated.    Cardiovascular: There are no swelling or varicosities present.    Lymphatic: Negative for adenopathy    Skin: Normal    Neurological: Level of consciousness normal. Oriented to place person and time and situation    Psychiatric: Oriented to time place person and situation    Left ankle with normal postoperative swelling.  Incisions look good with no signs or symptoms of infection.  There is also postoperative ecchymosis.    XRAY Report/ Interpretation:  Left ankle AP, lateral, and oblique x-rays taken in the office today reviewed the patient demonstrate status post bimalleolar ORIF with no displacement compared to the fluoroscopy pictures that were done intraoperatively      Assessment:       1. S/P ORIF (open reduction internal fixation) fracture        Plan:       Jordan was seen today for post-op evaluation.    Diagnoses and all orders for this visit:    S/P ORIF (open reduction internal fixation) fracture  -     X-Ray Ankle Complete Left         No follow-ups on file.    Patient placed in a fracture walker boot.  Follow-up in 2 weeks or sooner if needed.      This note was created using Dragon voice recognition software that occasionally misinterpreted phrases or words.

## 2021-01-04 ENCOUNTER — OFFICE VISIT (OUTPATIENT)
Dept: ORTHOPEDICS | Facility: CLINIC | Age: 46
End: 2021-01-04
Payer: COMMERCIAL

## 2021-01-04 VITALS
BODY MASS INDEX: 27 KG/M2 | SYSTOLIC BLOOD PRESSURE: 130 MMHG | WEIGHT: 172 LBS | HEART RATE: 90 BPM | HEIGHT: 67 IN | DIASTOLIC BLOOD PRESSURE: 90 MMHG

## 2021-01-04 DIAGNOSIS — Z87.81 S/P ORIF (OPEN REDUCTION INTERNAL FIXATION) FRACTURE: Primary | ICD-10-CM

## 2021-01-04 DIAGNOSIS — Z98.890 S/P ORIF (OPEN REDUCTION INTERNAL FIXATION) FRACTURE: Primary | ICD-10-CM

## 2021-01-04 PROCEDURE — 1125F AMNT PAIN NOTED PAIN PRSNT: CPT | Mod: S$GLB,,, | Performed by: PHYSICIAN ASSISTANT

## 2021-01-04 PROCEDURE — 3008F BODY MASS INDEX DOCD: CPT | Mod: S$GLB,,, | Performed by: PHYSICIAN ASSISTANT

## 2021-01-04 PROCEDURE — 3008F PR BODY MASS INDEX (BMI) DOCUMENTED: ICD-10-PCS | Mod: S$GLB,,, | Performed by: PHYSICIAN ASSISTANT

## 2021-01-04 PROCEDURE — 1125F PR PAIN SEVERITY QUANTIFIED, PAIN PRESENT: ICD-10-PCS | Mod: S$GLB,,, | Performed by: PHYSICIAN ASSISTANT

## 2021-01-04 PROCEDURE — 99024 POSTOP FOLLOW-UP VISIT: CPT | Mod: S$GLB,,, | Performed by: PHYSICIAN ASSISTANT

## 2021-01-04 PROCEDURE — 99024 PR POST-OP FOLLOW-UP VISIT: ICD-10-PCS | Mod: S$GLB,,, | Performed by: PHYSICIAN ASSISTANT

## 2021-01-04 RX ORDER — OXYCODONE AND ACETAMINOPHEN 10; 325 MG/1; MG/1
1 TABLET ORAL EVERY 4 HOURS PRN
Qty: 28 TABLET | Refills: 0 | Status: SHIPPED | OUTPATIENT
Start: 2021-01-04 | End: 2021-01-11

## 2021-01-12 ENCOUNTER — OFFICE VISIT (OUTPATIENT)
Dept: ORTHOPEDICS | Facility: CLINIC | Age: 46
End: 2021-01-12
Payer: COMMERCIAL

## 2021-01-12 VITALS — WEIGHT: 165 LBS | BODY MASS INDEX: 24.44 KG/M2 | HEIGHT: 69 IN

## 2021-01-12 DIAGNOSIS — Z87.81 S/P ORIF (OPEN REDUCTION INTERNAL FIXATION) FRACTURE: Primary | ICD-10-CM

## 2021-01-12 DIAGNOSIS — M67.431 GANGLION CYST OF VOLAR ASPECT OF RIGHT WRIST: ICD-10-CM

## 2021-01-12 DIAGNOSIS — Z98.890 S/P ORIF (OPEN REDUCTION INTERNAL FIXATION) FRACTURE: Primary | ICD-10-CM

## 2021-01-12 DIAGNOSIS — M25.531 RIGHT WRIST PAIN: ICD-10-CM

## 2021-01-12 PROCEDURE — 99214 PR OFFICE/OUTPT VISIT, EST, LEVL IV, 30-39 MIN: ICD-10-PCS | Mod: 24,25,S$GLB, | Performed by: ORTHOPAEDIC SURGERY

## 2021-01-12 PROCEDURE — 99024 PR POST-OP FOLLOW-UP VISIT: ICD-10-PCS | Mod: S$GLB,,, | Performed by: ORTHOPAEDIC SURGERY

## 2021-01-12 PROCEDURE — 3008F PR BODY MASS INDEX (BMI) DOCUMENTED: ICD-10-PCS | Mod: S$GLB,,, | Performed by: ORTHOPAEDIC SURGERY

## 2021-01-12 PROCEDURE — 1126F PR PAIN SEVERITY QUANTIFIED, NO PAIN PRESENT: ICD-10-PCS | Mod: S$GLB,,, | Performed by: ORTHOPAEDIC SURGERY

## 2021-01-12 PROCEDURE — 99214 OFFICE O/P EST MOD 30 MIN: CPT | Mod: 24,25,S$GLB, | Performed by: ORTHOPAEDIC SURGERY

## 2021-01-12 PROCEDURE — 1126F AMNT PAIN NOTED NONE PRSNT: CPT | Mod: S$GLB,,, | Performed by: ORTHOPAEDIC SURGERY

## 2021-01-12 PROCEDURE — 3008F BODY MASS INDEX DOCD: CPT | Mod: S$GLB,,, | Performed by: ORTHOPAEDIC SURGERY

## 2021-01-12 PROCEDURE — 20612 ASPIRATE/INJ GANGLION CYST: CPT | Mod: 79,S$GLB,, | Performed by: ORTHOPAEDIC SURGERY

## 2021-01-12 PROCEDURE — 99024 POSTOP FOLLOW-UP VISIT: CPT | Mod: S$GLB,,, | Performed by: ORTHOPAEDIC SURGERY

## 2021-01-12 PROCEDURE — 20612 GANGLION CYST: ICD-10-PCS | Mod: 79,S$GLB,, | Performed by: ORTHOPAEDIC SURGERY

## 2021-01-12 RX ORDER — METHYLPREDNISOLONE ACETATE 40 MG/ML
40 INJECTION, SUSPENSION INTRA-ARTICULAR; INTRALESIONAL; INTRAMUSCULAR; SOFT TISSUE
Status: DISCONTINUED | OUTPATIENT
Start: 2021-01-12 | End: 2021-01-12 | Stop reason: HOSPADM

## 2021-01-12 RX ADMIN — METHYLPREDNISOLONE ACETATE 40 MG: 40 INJECTION, SUSPENSION INTRA-ARTICULAR; INTRALESIONAL; INTRAMUSCULAR; SOFT TISSUE at 09:01

## 2021-02-02 ENCOUNTER — OFFICE VISIT (OUTPATIENT)
Dept: ORTHOPEDICS | Facility: CLINIC | Age: 46
End: 2021-02-02
Payer: COMMERCIAL

## 2021-02-02 VITALS
WEIGHT: 165 LBS | BODY MASS INDEX: 24.44 KG/M2 | SYSTOLIC BLOOD PRESSURE: 132 MMHG | HEIGHT: 69 IN | DIASTOLIC BLOOD PRESSURE: 74 MMHG | HEART RATE: 70 BPM | OXYGEN SATURATION: 99 %

## 2021-02-02 DIAGNOSIS — Z98.890 S/P ORIF (OPEN REDUCTION INTERNAL FIXATION) FRACTURE: Primary | ICD-10-CM

## 2021-02-02 DIAGNOSIS — Z87.81 S/P ORIF (OPEN REDUCTION INTERNAL FIXATION) FRACTURE: Primary | ICD-10-CM

## 2021-02-02 PROCEDURE — 99024 PR POST-OP FOLLOW-UP VISIT: ICD-10-PCS | Mod: S$GLB,,, | Performed by: ORTHOPAEDIC SURGERY

## 2021-02-02 PROCEDURE — 99024 POSTOP FOLLOW-UP VISIT: CPT | Mod: S$GLB,,, | Performed by: ORTHOPAEDIC SURGERY

## 2021-02-02 PROCEDURE — 1126F AMNT PAIN NOTED NONE PRSNT: CPT | Mod: S$GLB,,, | Performed by: ORTHOPAEDIC SURGERY

## 2021-02-02 PROCEDURE — 3008F PR BODY MASS INDEX (BMI) DOCUMENTED: ICD-10-PCS | Mod: S$GLB,,, | Performed by: ORTHOPAEDIC SURGERY

## 2021-02-02 PROCEDURE — 1126F PR PAIN SEVERITY QUANTIFIED, NO PAIN PRESENT: ICD-10-PCS | Mod: S$GLB,,, | Performed by: ORTHOPAEDIC SURGERY

## 2021-02-02 PROCEDURE — 3008F BODY MASS INDEX DOCD: CPT | Mod: S$GLB,,, | Performed by: ORTHOPAEDIC SURGERY

## 2021-02-09 NOTE — CONSULTS
This is a 45-year-old male who was injured in a snowboarding accident.  At that time he was treated locally with a splint.  He was noted to have a severely displaced ankle fracture.  He presented to the emergency room.  He was admitted for definitive care.    Patient is in well position splint.  He can flex and extend his toes.  Sensation is preserved.    AP and lateral and oblique of the left ankle demonstrates a displaced bimalleolar ankle fracture.    This is a gentleman who has a displaced bimalleolar ankle fracture.  The risks and benefits of surgical intervention are discussed with him in great detail.  We spoke about infection DVT RSD loss of motion other complications.  He clearly understood wished to proceed.  Surgical stabilization will be undertaken as soon is practically available

## 2021-02-24 ENCOUNTER — TELEPHONE (OUTPATIENT)
Dept: ORTHOPEDICS | Facility: CLINIC | Age: 46
End: 2021-02-24

## 2021-04-01 ENCOUNTER — OFFICE VISIT (OUTPATIENT)
Dept: URGENT CARE | Facility: CLINIC | Age: 46
End: 2021-04-01
Payer: COMMERCIAL

## 2021-04-01 VITALS
RESPIRATION RATE: 16 BRPM | OXYGEN SATURATION: 98 % | TEMPERATURE: 97 F | DIASTOLIC BLOOD PRESSURE: 75 MMHG | SYSTOLIC BLOOD PRESSURE: 118 MMHG | HEART RATE: 71 BPM

## 2021-04-01 DIAGNOSIS — B34.9 ACUTE VIRAL SYNDROME: Primary | ICD-10-CM

## 2021-04-01 DIAGNOSIS — Z11.52 ENCOUNTER FOR SCREENING FOR COVID-19: ICD-10-CM

## 2021-04-01 PROCEDURE — 99204 OFFICE O/P NEW MOD 45 MIN: CPT | Mod: S$GLB,,, | Performed by: EMERGENCY MEDICINE

## 2021-04-01 PROCEDURE — 99204 PR OFFICE/OUTPT VISIT, NEW, LEVL IV, 45-59 MIN: ICD-10-PCS | Mod: S$GLB,,, | Performed by: EMERGENCY MEDICINE

## 2021-04-06 ENCOUNTER — OFFICE VISIT (OUTPATIENT)
Dept: ORTHOPEDICS | Facility: CLINIC | Age: 46
End: 2021-04-06
Payer: COMMERCIAL

## 2021-04-06 VITALS
SYSTOLIC BLOOD PRESSURE: 140 MMHG | WEIGHT: 165 LBS | HEIGHT: 69 IN | HEART RATE: 64 BPM | BODY MASS INDEX: 24.44 KG/M2 | DIASTOLIC BLOOD PRESSURE: 82 MMHG

## 2021-04-06 DIAGNOSIS — Z98.890 S/P ORIF (OPEN REDUCTION INTERNAL FIXATION) FRACTURE: Primary | ICD-10-CM

## 2021-04-06 DIAGNOSIS — Z87.81 S/P ORIF (OPEN REDUCTION INTERNAL FIXATION) FRACTURE: Primary | ICD-10-CM

## 2021-04-06 PROCEDURE — 99213 PR OFFICE/OUTPT VISIT, EST, LEVL III, 20-29 MIN: ICD-10-PCS | Mod: S$GLB,,, | Performed by: ORTHOPAEDIC SURGERY

## 2021-04-06 PROCEDURE — 3008F PR BODY MASS INDEX (BMI) DOCUMENTED: ICD-10-PCS | Mod: S$GLB,,, | Performed by: ORTHOPAEDIC SURGERY

## 2021-04-06 PROCEDURE — 1125F PR PAIN SEVERITY QUANTIFIED, PAIN PRESENT: ICD-10-PCS | Mod: S$GLB,,, | Performed by: ORTHOPAEDIC SURGERY

## 2021-04-06 PROCEDURE — 99213 OFFICE O/P EST LOW 20 MIN: CPT | Mod: S$GLB,,, | Performed by: ORTHOPAEDIC SURGERY

## 2021-04-06 PROCEDURE — 1125F AMNT PAIN NOTED PAIN PRSNT: CPT | Mod: S$GLB,,, | Performed by: ORTHOPAEDIC SURGERY

## 2021-04-06 PROCEDURE — 3008F BODY MASS INDEX DOCD: CPT | Mod: S$GLB,,, | Performed by: ORTHOPAEDIC SURGERY

## 2021-04-09 ENCOUNTER — OFFICE VISIT (OUTPATIENT)
Dept: PODIATRY | Facility: CLINIC | Age: 46
End: 2021-04-09
Payer: COMMERCIAL

## 2021-04-09 ENCOUNTER — HOSPITAL ENCOUNTER (OUTPATIENT)
Dept: RADIOLOGY | Facility: CLINIC | Age: 46
Discharge: HOME | End: 2021-04-09
Attending: PODIATRIST
Payer: COMMERCIAL

## 2021-04-09 VITALS
HEART RATE: 59 BPM | OXYGEN SATURATION: 99 % | HEIGHT: 69 IN | SYSTOLIC BLOOD PRESSURE: 138 MMHG | RESPIRATION RATE: 18 BRPM | DIASTOLIC BLOOD PRESSURE: 76 MMHG | BODY MASS INDEX: 24.44 KG/M2 | WEIGHT: 165 LBS

## 2021-04-09 DIAGNOSIS — M79.675 PAIN IN LEFT TOE(S): ICD-10-CM

## 2021-04-09 DIAGNOSIS — M19.079 INFLAMMATION OF FOOT JOINT: ICD-10-CM

## 2021-04-09 DIAGNOSIS — M77.8 EXTENSOR TENDINITIS OF FOOT: ICD-10-CM

## 2021-04-09 DIAGNOSIS — M79.672 LEFT FOOT PAIN: Primary | ICD-10-CM

## 2021-04-09 PROCEDURE — 1125F AMNT PAIN NOTED PAIN PRSNT: CPT | Mod: S$GLB,,, | Performed by: PODIATRIST

## 2021-04-09 PROCEDURE — 3008F BODY MASS INDEX DOCD: CPT | Mod: CPTII,S$GLB,, | Performed by: PODIATRIST

## 2021-04-09 PROCEDURE — 3008F PR BODY MASS INDEX (BMI) DOCUMENTED: ICD-10-PCS | Mod: CPTII,S$GLB,, | Performed by: PODIATRIST

## 2021-04-09 PROCEDURE — 73630 XR FOOT COMPLETE 3 VIEW LEFT: ICD-10-PCS | Mod: LT,S$GLB,, | Performed by: RADIOLOGY

## 2021-04-09 PROCEDURE — 99214 PR OFFICE/OUTPT VISIT, EST, LEVL IV, 30-39 MIN: ICD-10-PCS | Mod: S$GLB,,, | Performed by: PODIATRIST

## 2021-04-09 PROCEDURE — 99214 OFFICE O/P EST MOD 30 MIN: CPT | Mod: S$GLB,,, | Performed by: PODIATRIST

## 2021-04-09 PROCEDURE — 1125F PR PAIN SEVERITY QUANTIFIED, PAIN PRESENT: ICD-10-PCS | Mod: S$GLB,,, | Performed by: PODIATRIST

## 2021-04-09 PROCEDURE — 73630 X-RAY EXAM OF FOOT: CPT | Mod: LT,S$GLB,, | Performed by: RADIOLOGY

## 2021-04-09 RX ORDER — HYDROCORTISONE 25 MG/G
CREAM TOPICAL 2 TIMES DAILY
Qty: 28 G | Refills: 2 | Status: SHIPPED | OUTPATIENT
Start: 2021-04-09

## 2021-04-09 RX ORDER — NAPROXEN 500 MG/1
500 TABLET ORAL 2 TIMES DAILY
Qty: 120 TABLET | Refills: 3 | Status: SHIPPED | OUTPATIENT
Start: 2021-04-09 | End: 2021-06-08

## 2021-05-06 ENCOUNTER — PATIENT MESSAGE (OUTPATIENT)
Dept: RESEARCH | Facility: HOSPITAL | Age: 46
End: 2021-05-06

## 2021-05-27 ENCOUNTER — OFFICE VISIT (OUTPATIENT)
Dept: ORTHOPEDICS | Facility: CLINIC | Age: 46
End: 2021-05-27
Payer: COMMERCIAL

## 2021-05-27 VITALS
HEART RATE: 55 BPM | WEIGHT: 175 LBS | DIASTOLIC BLOOD PRESSURE: 78 MMHG | SYSTOLIC BLOOD PRESSURE: 106 MMHG | BODY MASS INDEX: 25.92 KG/M2 | HEIGHT: 69 IN

## 2021-05-27 DIAGNOSIS — M75.41 IMPINGEMENT SYNDROME OF RIGHT SHOULDER: Primary | ICD-10-CM

## 2021-05-27 PROCEDURE — 3008F BODY MASS INDEX DOCD: CPT | Mod: S$GLB,,, | Performed by: ORTHOPAEDIC SURGERY

## 2021-05-27 PROCEDURE — 20610 DRAIN/INJ JOINT/BURSA W/O US: CPT | Mod: RT,S$GLB,, | Performed by: ORTHOPAEDIC SURGERY

## 2021-05-27 PROCEDURE — 20610 LARGE JOINT ASPIRATION/INJECTION: R SUBACROMIAL BURSA: ICD-10-PCS | Mod: RT,S$GLB,, | Performed by: ORTHOPAEDIC SURGERY

## 2021-05-27 PROCEDURE — 3008F PR BODY MASS INDEX (BMI) DOCUMENTED: ICD-10-PCS | Mod: S$GLB,,, | Performed by: ORTHOPAEDIC SURGERY

## 2021-05-27 PROCEDURE — 99213 OFFICE O/P EST LOW 20 MIN: CPT | Mod: 25,S$GLB,, | Performed by: ORTHOPAEDIC SURGERY

## 2021-05-27 PROCEDURE — 1125F AMNT PAIN NOTED PAIN PRSNT: CPT | Mod: S$GLB,,, | Performed by: ORTHOPAEDIC SURGERY

## 2021-05-27 PROCEDURE — 1125F PR PAIN SEVERITY QUANTIFIED, PAIN PRESENT: ICD-10-PCS | Mod: S$GLB,,, | Performed by: ORTHOPAEDIC SURGERY

## 2021-05-27 PROCEDURE — 99213 PR OFFICE/OUTPT VISIT, EST, LEVL III, 20-29 MIN: ICD-10-PCS | Mod: 25,S$GLB,, | Performed by: ORTHOPAEDIC SURGERY

## 2021-05-27 RX ORDER — METHYLPREDNISOLONE ACETATE 40 MG/ML
40 INJECTION, SUSPENSION INTRA-ARTICULAR; INTRALESIONAL; INTRAMUSCULAR; SOFT TISSUE
Status: DISCONTINUED | OUTPATIENT
Start: 2021-05-27 | End: 2021-05-27 | Stop reason: HOSPADM

## 2021-05-27 RX ADMIN — METHYLPREDNISOLONE ACETATE 40 MG: 40 INJECTION, SUSPENSION INTRA-ARTICULAR; INTRALESIONAL; INTRAMUSCULAR; SOFT TISSUE at 07:05

## 2021-06-15 ENCOUNTER — OFFICE VISIT (OUTPATIENT)
Dept: ORTHOPEDICS | Facility: CLINIC | Age: 46
End: 2021-06-15
Payer: COMMERCIAL

## 2021-06-15 VITALS — HEIGHT: 69 IN | BODY MASS INDEX: 25.92 KG/M2 | WEIGHT: 175 LBS

## 2021-06-15 DIAGNOSIS — M75.41 IMPINGEMENT SYNDROME OF RIGHT SHOULDER: Primary | ICD-10-CM

## 2021-06-15 DIAGNOSIS — R20.2 NUMBNESS AND TINGLING OF RIGHT UPPER EXTREMITY: ICD-10-CM

## 2021-06-15 DIAGNOSIS — M19.011 ARTHRITIS OF RIGHT ACROMIOCLAVICULAR JOINT: ICD-10-CM

## 2021-06-15 DIAGNOSIS — M54.12 RIGHT CERVICAL RADICULOPATHY: ICD-10-CM

## 2021-06-15 DIAGNOSIS — M75.101 NONTRAUMATIC TEAR OF RIGHT ROTATOR CUFF, UNSPECIFIED TEAR EXTENT: ICD-10-CM

## 2021-06-15 DIAGNOSIS — R20.0 NUMBNESS AND TINGLING OF RIGHT UPPER EXTREMITY: ICD-10-CM

## 2021-06-15 PROCEDURE — 99213 OFFICE O/P EST LOW 20 MIN: CPT | Mod: S$GLB,,, | Performed by: ORTHOPAEDIC SURGERY

## 2021-06-15 PROCEDURE — 3008F PR BODY MASS INDEX (BMI) DOCUMENTED: ICD-10-PCS | Mod: S$GLB,,, | Performed by: ORTHOPAEDIC SURGERY

## 2021-06-15 PROCEDURE — 3008F BODY MASS INDEX DOCD: CPT | Mod: S$GLB,,, | Performed by: ORTHOPAEDIC SURGERY

## 2021-06-15 PROCEDURE — 1125F AMNT PAIN NOTED PAIN PRSNT: CPT | Mod: S$GLB,,, | Performed by: ORTHOPAEDIC SURGERY

## 2021-06-15 PROCEDURE — 1125F PR PAIN SEVERITY QUANTIFIED, PAIN PRESENT: ICD-10-PCS | Mod: S$GLB,,, | Performed by: ORTHOPAEDIC SURGERY

## 2021-06-15 PROCEDURE — 99213 PR OFFICE/OUTPT VISIT, EST, LEVL III, 20-29 MIN: ICD-10-PCS | Mod: S$GLB,,, | Performed by: ORTHOPAEDIC SURGERY

## 2021-07-02 ENCOUNTER — PROCEDURE VISIT (OUTPATIENT)
Dept: PHYSICAL MEDICINE AND REHAB | Facility: CLINIC | Age: 46
End: 2021-07-02
Payer: COMMERCIAL

## 2021-07-02 DIAGNOSIS — R20.0 NUMBNESS AND TINGLING OF RIGHT UPPER EXTREMITY: ICD-10-CM

## 2021-07-02 DIAGNOSIS — M54.12 RIGHT CERVICAL RADICULOPATHY: ICD-10-CM

## 2021-07-02 DIAGNOSIS — R20.2 NUMBNESS AND TINGLING OF RIGHT UPPER EXTREMITY: ICD-10-CM

## 2021-07-02 PROCEDURE — 95886 MUSC TEST DONE W/N TEST COMP: CPT | Mod: S$GLB,,, | Performed by: PHYSICAL MEDICINE & REHABILITATION

## 2021-07-02 PROCEDURE — 95886 PR EMG COMPLETE, W/ NERVE CONDUCTION STUDIES, 5+ MUSCLES: ICD-10-PCS | Mod: S$GLB,,, | Performed by: PHYSICAL MEDICINE & REHABILITATION

## 2021-07-02 PROCEDURE — 95910 NRV CNDJ TEST 7-8 STUDIES: CPT | Mod: S$GLB,,, | Performed by: PHYSICAL MEDICINE & REHABILITATION

## 2021-07-02 PROCEDURE — 95910 PR NERVE CONDUCTION STUDY; 7-8 STUDIES: ICD-10-PCS | Mod: S$GLB,,, | Performed by: PHYSICAL MEDICINE & REHABILITATION

## 2021-07-13 ENCOUNTER — OFFICE VISIT (OUTPATIENT)
Dept: ORTHOPEDICS | Facility: CLINIC | Age: 46
End: 2021-07-13
Payer: COMMERCIAL

## 2021-07-13 VITALS — HEIGHT: 69 IN | WEIGHT: 175 LBS | BODY MASS INDEX: 25.92 KG/M2

## 2021-07-13 DIAGNOSIS — M77.11 LATERAL EPICONDYLITIS OF RIGHT ELBOW: ICD-10-CM

## 2021-07-13 DIAGNOSIS — M75.101 NONTRAUMATIC TEAR OF RIGHT ROTATOR CUFF, UNSPECIFIED TEAR EXTENT: Primary | ICD-10-CM

## 2021-07-13 PROCEDURE — 99213 PR OFFICE/OUTPT VISIT, EST, LEVL III, 20-29 MIN: ICD-10-PCS | Mod: S$GLB,,, | Performed by: ORTHOPAEDIC SURGERY

## 2021-07-13 PROCEDURE — 3008F PR BODY MASS INDEX (BMI) DOCUMENTED: ICD-10-PCS | Mod: S$GLB,,, | Performed by: ORTHOPAEDIC SURGERY

## 2021-07-13 PROCEDURE — 1125F AMNT PAIN NOTED PAIN PRSNT: CPT | Mod: S$GLB,,, | Performed by: ORTHOPAEDIC SURGERY

## 2021-07-13 PROCEDURE — 1125F PR PAIN SEVERITY QUANTIFIED, PAIN PRESENT: ICD-10-PCS | Mod: S$GLB,,, | Performed by: ORTHOPAEDIC SURGERY

## 2021-07-13 PROCEDURE — 99213 OFFICE O/P EST LOW 20 MIN: CPT | Mod: S$GLB,,, | Performed by: ORTHOPAEDIC SURGERY

## 2021-07-13 PROCEDURE — 3008F BODY MASS INDEX DOCD: CPT | Mod: S$GLB,,, | Performed by: ORTHOPAEDIC SURGERY

## 2021-07-29 ENCOUNTER — IMMUNIZATION (OUTPATIENT)
Dept: PRIMARY CARE CLINIC | Facility: CLINIC | Age: 46
End: 2021-07-29
Payer: COMMERCIAL

## 2021-07-29 DIAGNOSIS — Z23 NEED FOR VACCINATION: Primary | ICD-10-CM

## 2021-07-29 PROCEDURE — 91300 COVID-19, MRNA, LNP-S, PF, 30 MCG/0.3 ML DOSE VACCINE: ICD-10-PCS | Mod: S$GLB,,, | Performed by: FAMILY MEDICINE

## 2021-07-29 PROCEDURE — 0001A COVID-19, MRNA, LNP-S, PF, 30 MCG/0.3 ML DOSE VACCINE: ICD-10-PCS | Mod: CV19,S$GLB,, | Performed by: FAMILY MEDICINE

## 2021-07-29 PROCEDURE — 91300 COVID-19, MRNA, LNP-S, PF, 30 MCG/0.3 ML DOSE VACCINE: CPT | Mod: S$GLB,,, | Performed by: FAMILY MEDICINE

## 2021-07-29 PROCEDURE — 0001A COVID-19, MRNA, LNP-S, PF, 30 MCG/0.3 ML DOSE VACCINE: CPT | Mod: CV19,S$GLB,, | Performed by: FAMILY MEDICINE

## 2021-08-19 ENCOUNTER — IMMUNIZATION (OUTPATIENT)
Dept: PRIMARY CARE CLINIC | Facility: CLINIC | Age: 46
End: 2021-08-19
Payer: COMMERCIAL

## 2021-08-19 DIAGNOSIS — Z23 NEED FOR VACCINATION: Primary | ICD-10-CM

## 2021-08-19 PROCEDURE — 0002A COVID-19, MRNA, LNP-S, PF, 30 MCG/0.3 ML DOSE VACCINE: ICD-10-PCS | Mod: CV19,S$GLB,, | Performed by: FAMILY MEDICINE

## 2021-08-19 PROCEDURE — 91300 COVID-19, MRNA, LNP-S, PF, 30 MCG/0.3 ML DOSE VACCINE: ICD-10-PCS | Mod: S$GLB,,, | Performed by: FAMILY MEDICINE

## 2021-08-19 PROCEDURE — 91300 COVID-19, MRNA, LNP-S, PF, 30 MCG/0.3 ML DOSE VACCINE: CPT | Mod: S$GLB,,, | Performed by: FAMILY MEDICINE

## 2021-08-19 PROCEDURE — 0002A COVID-19, MRNA, LNP-S, PF, 30 MCG/0.3 ML DOSE VACCINE: CPT | Mod: CV19,S$GLB,, | Performed by: FAMILY MEDICINE

## 2021-09-07 ENCOUNTER — OFFICE VISIT (OUTPATIENT)
Dept: ORTHOPEDICS | Facility: CLINIC | Age: 46
End: 2021-09-07
Payer: COMMERCIAL

## 2021-09-07 VITALS — HEIGHT: 69 IN | WEIGHT: 175 LBS | BODY MASS INDEX: 25.92 KG/M2

## 2021-09-07 DIAGNOSIS — M75.41 IMPINGEMENT SYNDROME OF RIGHT SHOULDER: ICD-10-CM

## 2021-09-07 DIAGNOSIS — M75.101 NONTRAUMATIC TEAR OF RIGHT ROTATOR CUFF, UNSPECIFIED TEAR EXTENT: ICD-10-CM

## 2021-09-07 DIAGNOSIS — M77.12 LATERAL EPICONDYLITIS, LEFT ELBOW: Primary | ICD-10-CM

## 2021-09-07 DIAGNOSIS — M19.011 ARTHRITIS OF RIGHT ACROMIOCLAVICULAR JOINT: ICD-10-CM

## 2021-09-07 PROCEDURE — 20551 NJX 1 TENDON ORIGIN/INSJ: CPT | Mod: S$GLB,,, | Performed by: ORTHOPAEDIC SURGERY

## 2021-09-07 PROCEDURE — 3008F BODY MASS INDEX DOCD: CPT | Mod: S$GLB,,, | Performed by: ORTHOPAEDIC SURGERY

## 2021-09-07 PROCEDURE — 99213 OFFICE O/P EST LOW 20 MIN: CPT | Mod: 25,S$GLB,, | Performed by: ORTHOPAEDIC SURGERY

## 2021-09-07 PROCEDURE — 20551 TENDON ORIGIN: L ELBOW: ICD-10-PCS | Mod: S$GLB,,, | Performed by: ORTHOPAEDIC SURGERY

## 2021-09-07 PROCEDURE — 1160F PR REVIEW ALL MEDS BY PRESCRIBER/CLIN PHARMACIST DOCUMENTED: ICD-10-PCS | Mod: S$GLB,,, | Performed by: ORTHOPAEDIC SURGERY

## 2021-09-07 PROCEDURE — 99213 PR OFFICE/OUTPT VISIT, EST, LEVL III, 20-29 MIN: ICD-10-PCS | Mod: 25,S$GLB,, | Performed by: ORTHOPAEDIC SURGERY

## 2021-09-07 PROCEDURE — 3008F PR BODY MASS INDEX (BMI) DOCUMENTED: ICD-10-PCS | Mod: S$GLB,,, | Performed by: ORTHOPAEDIC SURGERY

## 2021-09-07 PROCEDURE — 1160F RVW MEDS BY RX/DR IN RCRD: CPT | Mod: S$GLB,,, | Performed by: ORTHOPAEDIC SURGERY

## 2021-09-07 RX ORDER — METHYLPREDNISOLONE ACETATE 40 MG/ML
40 INJECTION, SUSPENSION INTRA-ARTICULAR; INTRALESIONAL; INTRAMUSCULAR; SOFT TISSUE
Status: DISCONTINUED | OUTPATIENT
Start: 2021-09-07 | End: 2021-09-07 | Stop reason: HOSPADM

## 2021-09-07 RX ORDER — NAPROXEN 500 MG/1
500 TABLET ORAL 2 TIMES DAILY
COMMUNITY
Start: 2021-07-21

## 2021-09-07 RX ADMIN — METHYLPREDNISOLONE ACETATE 40 MG: 40 INJECTION, SUSPENSION INTRA-ARTICULAR; INTRALESIONAL; INTRAMUSCULAR; SOFT TISSUE at 10:09

## 2021-09-16 ENCOUNTER — HOSPITAL ENCOUNTER (OUTPATIENT)
Dept: RADIOLOGY | Facility: HOSPITAL | Age: 46
Discharge: HOME | End: 2021-09-16
Attending: ORTHOPAEDIC SURGERY
Payer: COMMERCIAL

## 2021-09-16 DIAGNOSIS — M19.011 ARTHRITIS OF RIGHT ACROMIOCLAVICULAR JOINT: ICD-10-CM

## 2021-09-16 DIAGNOSIS — M75.101 NONTRAUMATIC TEAR OF RIGHT ROTATOR CUFF, UNSPECIFIED TEAR EXTENT: ICD-10-CM

## 2021-09-16 DIAGNOSIS — M77.12 LATERAL EPICONDYLITIS, LEFT ELBOW: ICD-10-CM

## 2021-09-16 DIAGNOSIS — M75.41 IMPINGEMENT SYNDROME OF RIGHT SHOULDER: ICD-10-CM

## 2021-09-16 PROCEDURE — 73221 MRI JOINT UPR EXTREM W/O DYE: CPT | Mod: TC,PO,RT

## 2021-09-16 PROCEDURE — 73221 MRI JOINT UPR EXTREM W/O DYE: CPT | Mod: TC,PO,LT

## 2021-12-08 ENCOUNTER — TELEPHONE (OUTPATIENT)
Dept: GASTROENTEROLOGY | Facility: CLINIC | Age: 46
End: 2021-12-08
Payer: COMMERCIAL

## 2021-12-08 DIAGNOSIS — Z12.11 SCREENING FOR COLON CANCER: Primary | ICD-10-CM

## 2022-01-06 DIAGNOSIS — Z01.818 PREOP TESTING: ICD-10-CM

## 2022-01-11 ENCOUNTER — TELEPHONE (OUTPATIENT)
Dept: GASTROENTEROLOGY | Facility: CLINIC | Age: 47
End: 2022-01-11
Payer: COMMERCIAL

## 2022-01-11 ENCOUNTER — LAB VISIT (OUTPATIENT)
Dept: PRIMARY CARE CLINIC | Facility: CLINIC | Age: 47
End: 2022-01-11
Payer: COMMERCIAL

## 2022-01-11 DIAGNOSIS — Z01.818 PREOP TESTING: ICD-10-CM

## 2022-01-11 PROCEDURE — U0005 INFEC AGEN DETEC AMPLI PROBE: HCPCS | Performed by: INTERNAL MEDICINE

## 2022-01-11 PROCEDURE — U0003 INFECTIOUS AGENT DETECTION BY NUCLEIC ACID (DNA OR RNA); SEVERE ACUTE RESPIRATORY SYNDROME CORONAVIRUS 2 (SARS-COV-2) (CORONAVIRUS DISEASE [COVID-19]), AMPLIFIED PROBE TECHNIQUE, MAKING USE OF HIGH THROUGHPUT TECHNOLOGIES AS DESCRIBED BY CMS-2020-01-R: HCPCS | Performed by: INTERNAL MEDICINE

## 2022-01-11 NOTE — TELEPHONE ENCOUNTER
Patient states he had at home +covid 2 weeks ago no symptoms at this time but he already went and repeated covid screening today and ENDO notified.

## 2022-01-11 NOTE — TELEPHONE ENCOUNTER
----- Message from Deniz Alas sent at 1/11/2022  9:28 AM CST -----  Regarding: advice  Contact: self  Type: Needs Medical Advice  Who Called:  self  Symptoms (please be specific):    How long has patient had these symptoms:    Pharmacy name and phone #:    Best Call Back Number: [phone]  Additional Information: Patient state he had covid (2) weeks ago, patient want to know if he should have covid test  for procedure done. Patient want to know if the insurance authorized the scheduled procedure.

## 2022-01-12 ENCOUNTER — TELEPHONE (OUTPATIENT)
Dept: GASTROENTEROLOGY | Facility: CLINIC | Age: 47
End: 2022-01-12
Payer: COMMERCIAL

## 2022-01-12 LAB
SARS-COV-2 RNA RESP QL NAA+PROBE: DETECTED
SARS-COV-2- CYCLE NUMBER: 32

## 2022-01-14 ENCOUNTER — ANESTHESIA (OUTPATIENT)
Dept: ENDOSCOPY | Facility: HOSPITAL | Age: 47
End: 2022-01-14
Payer: COMMERCIAL

## 2022-01-14 ENCOUNTER — ANESTHESIA EVENT (OUTPATIENT)
Dept: ENDOSCOPY | Facility: HOSPITAL | Age: 47
End: 2022-01-14
Payer: COMMERCIAL

## 2022-01-14 ENCOUNTER — HOSPITAL ENCOUNTER (OUTPATIENT)
Facility: HOSPITAL | Age: 47
Discharge: HOME | End: 2022-01-14
Attending: INTERNAL MEDICINE | Admitting: INTERNAL MEDICINE
Payer: COMMERCIAL

## 2022-01-14 DIAGNOSIS — K64.8 INTERNAL HEMORRHOIDS: ICD-10-CM

## 2022-01-14 DIAGNOSIS — K63.5 POLYP OF COLON, UNSPECIFIED PART OF COLON, UNSPECIFIED TYPE: Primary | ICD-10-CM

## 2022-01-14 DIAGNOSIS — K57.90 DIVERTICULOSIS: ICD-10-CM

## 2022-01-14 DIAGNOSIS — Z12.11 SCREEN FOR COLON CANCER: ICD-10-CM

## 2022-01-14 PROCEDURE — 37000008 HC ANESTHESIA 1ST 15 MINUTES: Performed by: INTERNAL MEDICINE

## 2022-01-14 PROCEDURE — 25000003 PHARM REV CODE 250: Performed by: NURSE ANESTHETIST, CERTIFIED REGISTERED

## 2022-01-14 PROCEDURE — 37000009 HC ANESTHESIA EA ADD 15 MINS: Performed by: INTERNAL MEDICINE

## 2022-01-14 PROCEDURE — D9220A PRA ANESTHESIA: Mod: 33,ANES,, | Performed by: ANESTHESIOLOGY

## 2022-01-14 PROCEDURE — 45385 PR COLONOSCOPY,REMV LESN,SNARE: ICD-10-PCS | Mod: 33,,, | Performed by: INTERNAL MEDICINE

## 2022-01-14 PROCEDURE — 88305 TISSUE EXAM BY PATHOLOGIST: CPT | Mod: 26,,, | Performed by: PATHOLOGY

## 2022-01-14 PROCEDURE — 25000003 PHARM REV CODE 250: Performed by: INTERNAL MEDICINE

## 2022-01-14 PROCEDURE — 27201089 HC SNARE, DISP (ANY): Performed by: INTERNAL MEDICINE

## 2022-01-14 PROCEDURE — 88305 TISSUE EXAM BY PATHOLOGIST: CPT | Performed by: PATHOLOGY

## 2022-01-14 PROCEDURE — D9220A PRA ANESTHESIA: ICD-10-PCS | Mod: 33,CRNA,, | Performed by: NURSE ANESTHETIST, CERTIFIED REGISTERED

## 2022-01-14 PROCEDURE — 63600175 PHARM REV CODE 636 W HCPCS: Performed by: NURSE ANESTHETIST, CERTIFIED REGISTERED

## 2022-01-14 PROCEDURE — 45385 COLONOSCOPY W/LESION REMOVAL: CPT | Mod: PT | Performed by: INTERNAL MEDICINE

## 2022-01-14 PROCEDURE — D9220A PRA ANESTHESIA: ICD-10-PCS | Mod: 33,ANES,, | Performed by: ANESTHESIOLOGY

## 2022-01-14 PROCEDURE — D9220A PRA ANESTHESIA: Mod: 33,CRNA,, | Performed by: NURSE ANESTHETIST, CERTIFIED REGISTERED

## 2022-01-14 PROCEDURE — 88305 TISSUE EXAM BY PATHOLOGIST: ICD-10-PCS | Mod: 26,,, | Performed by: PATHOLOGY

## 2022-01-14 PROCEDURE — 45385 COLONOSCOPY W/LESION REMOVAL: CPT | Mod: 33,,, | Performed by: INTERNAL MEDICINE

## 2022-01-14 RX ORDER — SODIUM CHLORIDE 9 MG/ML
INJECTION, SOLUTION INTRAVENOUS CONTINUOUS
Status: DISCONTINUED | OUTPATIENT
Start: 2022-01-14 | End: 2022-01-14 | Stop reason: HOSPADM

## 2022-01-14 RX ORDER — LIDOCAINE HCL/PF 100 MG/5ML
SYRINGE (ML) INTRAVENOUS
Status: DISCONTINUED | OUTPATIENT
Start: 2022-01-14 | End: 2022-01-14

## 2022-01-14 RX ORDER — PROPOFOL 10 MG/ML
VIAL (ML) INTRAVENOUS
Status: DISCONTINUED | OUTPATIENT
Start: 2022-01-14 | End: 2022-01-14

## 2022-01-14 RX ADMIN — PROPOFOL 50 MG: 10 INJECTION, EMULSION INTRAVENOUS at 08:01

## 2022-01-14 RX ADMIN — SODIUM CHLORIDE: 0.9 INJECTION, SOLUTION INTRAVENOUS at 08:01

## 2022-01-14 RX ADMIN — PROPOFOL 30 MG: 10 INJECTION, EMULSION INTRAVENOUS at 08:01

## 2022-01-14 RX ADMIN — LIDOCAINE HYDROCHLORIDE 50 MG: 20 INJECTION INTRAVENOUS at 08:01

## 2022-01-14 RX ADMIN — PROPOFOL 150 MG: 10 INJECTION, EMULSION INTRAVENOUS at 08:01

## 2022-01-14 NOTE — DISCHARGE INSTRUCTIONS
"Patient Education       Colon Polyps   The Basics   Written by the doctors and editors at [facility]   What are colon polyps? -- Colon polyps are tiny growths that form on the inside of the large intestine (also known as the colon) (figure 1). Polyps are very common. About one-third to one-half of all adults have them by the time they are 50 years old. They do not usually cause symptoms. But some polyps can be or become cancer, so doctors sometimes remove them.  What are the symptoms of colon polyps? -- Colon polyps do not usually cause symptoms.  How do doctors find colon polyps? -- Doctors usually find colon polyps when they are doing screening tests to check for colon or rectal cancer. Cancer screening tests are tests that are done to try and find cancer early, before a person has symptoms. The screening tests for colon and rectal cancer include:  · Colonoscopy - Before having a colonoscopy, you will get medicine to help you relax. Then a doctor will put a thin tube into your anus and advance it into your colon (figure 2). The tube has a camera attached to it, so the doctor can look inside your colon. The tube also has tools on the end, so the doctor can remove pieces of tissue, including polyps. After polyps are removed, they usually go to a lab to be tested for cancer and other problems.  · Sigmoidoscopy - A sigmoidoscopy is very similar to a colonoscopy. The only difference is that this test looks only at the first part of the colon, and a colonoscopy looks at the whole colon.  · CT colonography (also known as virtual colonoscopy) - For a virtual colonoscopy, you have a special kind of X-ray taken, called a "CT scan." This test creates pictures of the colon.  · Stool test - "Stool" is another word for "bowel movements." Stool tests check for blood or abnormal genes in samples of stool. If a stool test indicates that something might be wrong with the colon, doctors usually follow up with a colonoscopy. Then " "doctors find polyps, if they are there.  · Capsule colonoscopy - Rarely, your doctor might do something called a "capsule" colonoscopy. For this test, you swallow a special capsule that contains tiny wireless video cameras.   How are colon polyps treated? -- Doctors remove polyps using the same tools they use for a colonoscopy. They can remove polyps either by snipping them off with a special cutting tool, or by catching the polyps in a noose (figure 3). Most polyps can be removed during a colonoscopy. But sometimes, large polyps need to be removed at a later time, either with another colonoscopy or with surgery.  What happens after I have polyps removed? -- You might need to have a colonoscopy every few years to check for more polyps. In some people polyps come back. And if you had the kind of polyps that could become cancer, your doctor will want to remove them as they appear. Also, if the polyps you had removed were the kind that could become cancer, people in your family might need to be checked for polyps and colon cancer earlier than if you did not have polyps.  Depending on your situation, your doctor might suggest genetic testing. This can show if your polyps are related to a specific gene that runs in families. If this turns out to be the case, they might recommend other tests that can be done to prevent cancer or find it early.  Can colon polyps be prevented? -- To reduce your chances of getting polyps or colon cancer:  · Eat a diet that is low in fat and high in fruits, vegetables, and fiber  · Lose weight, if you are overweight  · Do not smoke  · Limit the amount of alcohol you drink  All topics are updated as new evidence becomes available and our peer review process is complete.  This topic retrieved from [facility] on: Sep 21, 2021.  Topic 56672 Version 8.0  Release: 29.4.2 - C29.263  © 2021 UpToDate, Inc. and/or its affiliates. All rights reserved.  figure 1: Digestive system     This drawing shows the " "organs in the body that process food. Together these organs are called "the digestive system," or "digestive tract." As food travels through this system, the body absorbs nutrients and water.  Graphic 56322 Version 4.0    figure 2: Colonoscopy     During a colonoscopy, you lie on your side and the doctor puts a thin tube with a camera into your anus (from behind). Then the doctor advances the tube into the rectum and colon. The camera sends pictures from inside your colon to a television screen.  Graphic 66085 Version 6.0    figure 3: Removing a colon polyp     One way doctors remove colon polyps is to use a noose as a tool. They loop a wire around the polyp and squeeze the loop tight. When the polyp comes off, the doctor sucks it up into the endoscope, so that it can go to the lab for tests.  Graphic 75416 Version 5.0    Consumer Information Use and Disclaimer   This information is not specific medical advice and does not replace information you receive from your health care provider. This is only a brief summary of general information. It does NOT include all information about conditions, illnesses, injuries, tests, procedures, treatments, therapies, discharge instructions or life-style choices that may apply to you. You must talk with your health care provider for complete information about your health and treatment options. This information should not be used to decide whether or not to accept your health care provider's advice, instructions or recommendations. Only your health care provider has the knowledge and training to provide advice that is right for you. The use of this information is governed by the [employer] End User License Agreement, available at https://www.PuzzleSocial.ViaSat/en/solutions/"[employer]"/about/clotilde.The use of [employer] content is governed by the [employer] Terms of Use. ©2021 UpToDate, Inc. All rights reserved.  Copyright   © 2021 UpToDate, Inc. and/or its affiliates. All rights reserved.    "

## 2022-01-14 NOTE — ANESTHESIA PREPROCEDURE EVALUATION
01/14/2022  Jordan Mulligan is a 46 y.o., male.    Pre-op Assessment    I have reviewed the Patient Summary Reports.     I have reviewed the Nursing Notes. I have reviewed the NPO Status.   I have reviewed the Medications.     Review of Systems  Anesthesia Hx:  No problems with previous Anesthesia  Neg history of prior surgery. Denies Family Hx of Anesthesia complications.   Denies Personal Hx of Anesthesia complications.   Social:  Non-Smoker, Alcohol Use    Hematology/Oncology:  Hematology Normal   Oncology Normal     EENT/Dental:EENT/Dental Normal   Cardiovascular:  Cardiovascular Normal     Pulmonary:   covid positive 1/1/2022 - no symptoms   Renal/:  Renal/ Normal     Hepatic/GI:  Hepatic/GI Normal Bowel Prep.    Musculoskeletal:  Musculoskeletal Normal    Neurological:  Neurology Normal    Endocrine:  Endocrine Normal    Dermatological:  Skin Normal    Psych:  Psychiatric Normal           Patient Active Problem List   Diagnosis    Fracture of talus    Leg fracture       Past Surgical History:   Procedure Laterality Date    ANKLE SURGERY Left 2014    OPEN REDUCTION AND INTERNAL FIXATION (ORIF) OF INJURY OF ANKLE Left 12/24/2020    Procedure: ORIF, ANKLE;  Surgeon: Jose Cortes MD;  Location: [facility];  Service: Orthopedics;  Laterality: Left;        Tobacco Use:  The patient  reports that he has never smoked. His smokeless tobacco use includes chew.     Results for orders placed or performed during the hospital encounter of 12/23/20   EKG 12-lead    Collection Time: 12/23/20  8:35 PM    Narrative    Test Reason : S82.90XA,    Vent. Rate : 061 BPM     Atrial Rate : 061 BPM     P-R Int : 138 ms          QRS Dur : 084 ms      QT Int : 394 ms       P-R-T Axes : 036 043 029 degrees     QTc Int : 396 ms    Sinus rhythm with Premature atrial complexes  Otherwise normal ECG  No previous ECGs  available  Confirmed by Timothy Ovalle MD ([id number]) on 12/24/2020 4:39:27 PM    Referred By: [email or web address]   SELF           Confirmed By:Timothy Ovalle MD             Lab Results   Component Value Date    WBC 6.89 12/24/2020    HGB 15.5 12/24/2020    HCT 45.8 12/24/2020    MCV 94 12/24/2020     12/24/2020     BMP  Lab Results   Component Value Date     12/24/2020    K 4.0 12/24/2020     12/24/2020    CO2 25 12/24/2020    BUN 13 12/24/2020    CREATININE 1.0 12/24/2020    CALCIUM 8.6 (L) 12/24/2020    ANIONGAP 8 12/24/2020    GLU 93 12/24/2020     12/23/2020       No results found for this or any previous visit.        Physical Exam  General:  Well nourished    Airway/Jaw/Neck:  Airway Findings: Mouth Opening: Normal Tongue: Normal  General Airway Assessment: Adult  Mallampati: II  Improves to II with phonation.  TM Distance: Normal, at least 6 cm  Jaw/Neck Findings:  Neck ROM: Normal ROM       Chest/Lungs:  Chest/Lungs Findings: Clear to auscultation, Normal Respiratory Rate     Heart/Vascular:  Heart Findings: Rate: Normal  Rhythm: Regular Rhythm  Sounds: Normal        Mental Status:  Mental Status Findings:  Cooperative, Alert and Oriented         Anesthesia Plan  Type of Anesthesia, risks & benefits discussed:  Anesthesia Type:  general    Patient's Preference:   Plan Factors:          Intra-op Monitoring Plan: standard ASA monitors  Intra-op Monitoring Plan Comments:   Post Op Pain Control Plan:   Post Op Pain Control Plan Comments:     Induction:   IV  Beta Blocker:  Patient is not currently on a Beta-Blocker (No further documentation required).       Informed Consent: Patient understands risks and agrees with Anesthesia plan.  Questions answered. Anesthesia consent signed with patient.  ASA Score: 1     Day of Surgery Review of History & Physical: I have interviewed and examined the patient. I have reviewed the patient's H&P dated:    H&P update referred to the surgeon.         Ready For  Surgery From Anesthesia Perspective.

## 2022-01-14 NOTE — ANESTHESIA POSTPROCEDURE EVALUATION
Anesthesia Post Evaluation    Patient: Jordan Mulligan    Procedure(s) Performed: Procedure(s) (LRB):  COLONOSCOPY(pos Covid 01/01/22 home kit and pt went again but not ill if results come back positive) (N/A)    Final Anesthesia Type: general      Patient location during evaluation: PACU  Patient participation: Yes- Able to Participate  Level of consciousness: sedated  Post-procedure vital signs: reviewed and stable  Pain management: adequate  Airway patency: patent    PONV status at discharge: No PONV  Anesthetic complications: no      Cardiovascular status: blood pressure returned to baseline  Respiratory status: spontaneous ventilation  Hydration status: euvolemic  Follow-up not needed.          Vitals Value Taken Time   /69 01/14/22 0925   Temp 36.3 °C (97.3 °F) 01/14/22 0905   Pulse 54 01/14/22 0925   Resp 18 01/14/22 0925   SpO2 98 % 01/14/22 0925         Event Time   Out of Recovery 09:32:06         Pain/Khris Score: Khris Score: 10 (1/14/2022  9:25 AM)

## 2022-01-14 NOTE — TRANSFER OF CARE
Anesthesia Transfer of Care Note    Patient: Jordan Mulligan    Procedure(s) Performed: Procedure(s) (LRB):  COLONOSCOPY(pos Covid 01/01/22 home kit and pt went again but not ill if results come back positive) (N/A)    Patient location: GI    Anesthesia Type: general    Transport from OR: Transported from OR on room air with adequate spontaneous ventilation    Post pain: adequate analgesia    Post assessment: no apparent anesthetic complications    Post vital signs: stable    Level of consciousness: awake    Nausea/Vomiting: no nausea/vomiting    Complications: none    Transfer of care protocol was followed      Last vitals: There were no vitals taken for this visit.

## 2022-01-14 NOTE — H&P
CC: Screening for colorectal cancer - first occurrence    46 year old male with above. States that symptoms are absent, no alleviating/exacerbating factors. No family history of colorectal CA. No personal history of polyps. No bleeding or weight loss.     ROS:  No headache, no fever/chills, no chest pain/SOB, no nausea/vomiting/diarrhea/constipation/GI bleeding/abdominal pain, no dysuria/hematuria.    VSSAF   Exam:   Alert and oriented x 3; no apparent distress   PERRLA, sclera anicteric  CV: Regular rate/rhythm, normal PMI   Lungs: Clear bilaterally with no wheeze/rales   Abdomen: Soft, NT/ND, normal bowel sounds   Ext: No cyanosis, clubbing     Impression:   As above    Plan:   Proceed with endoscopy. Further recs to follow.       
Declined

## 2022-01-14 NOTE — PROVATION PATIENT INSTRUCTIONS
Discharge Summary/Instructions after an Endoscopic Procedure  Patient Name: Jordan Mulligan  Patient MRN: [id number]  Patient YOB: 1975 Friday, January 14, 2022  Felipe Sanchez MD  Dear patient,  As a result of recent federal legislation (The Federal Cures Act), you may   receive lab or pathology results from your procedure in your [facility]   account before your physician is able to contact you. Your physician or   their representative will relay the results to you with their   recommendations at their soonest availability.  Thank you,  RESTRICTIONS:  During your procedure today, you received medications for sedation.  These   medications may affect your judgment, balance and coordination.  Therefore,   for 24 hours, you have the following restrictions:   - DO NOT drive a car, operate machinery, make legal/financial decisions,   sign important papers or drink alcohol.    ACTIVITY:  Today: no heavy lifting, straining or running due to procedural   sedation/anesthesia.  The following day: return to full activity including work.  DIET:  Eat and drink normally unless instructed otherwise.     TREATMENT FOR COMMON SIDE EFFECTS:  - Mild abdominal pain, nausea, belching, bloating or excessive gas:  rest,   eat lightly and use a heating pad.  - Sore Throat: treat with throat lozenges and/or gargle with warm salt   water.  - Because air was used during the procedure, expelling large amounts of air   from your rectum or belching is normal.  - If a bowel prep was taken, you may not have a bowel movement for 1-3 days.    This is normal.  SYMPTOMS TO WATCH FOR AND REPORT TO YOUR PHYSICIAN:  1. Abdominal pain or bloating, other than gas cramps.  2. Chest pain.  3. Back pain.  4. Signs of infection such as: chills or fever occurring within 24 hours   after the procedure.  5. Rectal bleeding, which would show as bright red, maroon, or black stools.   (A tablespoon of blood from the rectum is not serious, especially  if   hemorrhoids are present.)  6. Vomiting.  7. Weakness or dizziness.  GO DIRECTLY TO THE NEAREST EMERGENCY ROOM IF YOU HAVE ANY OF THE FOLLOWING:      Difficulty breathing              Chills and/or fever over 101 F   Persistent vomiting and/or vomiting blood   Severe abdominal pain   Severe chest pain   Black, tarry stools   Bleeding- more than one tablespoon   Any other symptom or condition that you feel may need urgent attention  Your doctor recommends these additional instructions:  If any biopsies were taken, your doctors clinic will contact you in 1 to 2   weeks with any results.  - Patient has a contact number available for emergencies.  The signs and   symptoms of potential delayed complications were discussed with the   patient.  Return to normal activities tomorrow.  Written discharge   instructions were provided to the patient.   - High fiber diet.   - Continue present medications.   - Await pathology results.   - Repeat colonoscopy in 5 years for surveillance.   - Discharge patient to home (ambulatory).   - Return to my office PRN.  For questions, problems or results please call your physician - Felipe Sanchez MD at Work:  [phone].  [facility] PHONE NUMBER: [phone]  IF A COMPLICATION OR EMERGENCY SITUATION ARISES AND YOU ARE UNABLE TO REACH   YOUR PHYSICIAN - GO DIRECTLY TO THE EMERGENCY ROOM.  Felipe Sanchez MD  1/14/2022 8:59:00 AM  This report has been verified and signed electronically.  Dear patient,  As a result of recent federal legislation (The Federal Cures Act), you may   receive lab or pathology results from your procedure in your [facility]   account before your physician is able to contact you. Your physician or   their representative will relay the results to you with their   recommendations at their soonest availability.  Thank you,  PROVATION

## 2022-01-18 VITALS
RESPIRATION RATE: 18 BRPM | HEART RATE: 54 BPM | TEMPERATURE: 97 F | SYSTOLIC BLOOD PRESSURE: 109 MMHG | DIASTOLIC BLOOD PRESSURE: 69 MMHG | OXYGEN SATURATION: 98 %

## 2022-01-21 LAB
FINAL PATHOLOGIC DIAGNOSIS: NORMAL
GROSS: NORMAL
Lab: NORMAL

## 2022-01-21 NOTE — PROGRESS NOTES
Please advise patient that polyp pathology was reviewed and is benign and is the adenomatous type which is precancerous/risk factor for cancer.  Repeat colonoscopy recommended in 5 years.  Place reminder in system for repeat colonoscopy.

## 2022-01-27 ENCOUNTER — TELEPHONE (OUTPATIENT)
Dept: GASTROENTEROLOGY | Facility: CLINIC | Age: 47
End: 2022-01-27
Payer: COMMERCIAL

## 2022-01-27 NOTE — TELEPHONE ENCOUNTER
----- Message from Felipe Soares MD sent at 1/21/2022  4:21 PM CST -----  Please advise patient that polyp pathology was reviewed and is benign and is the adenomatous type which is precancerous/risk factor for cancer.  Repeat colonoscopy recommended in 5 years.  Place reminder in system for repeat colonoscopy.

## 2023-02-09 ENCOUNTER — OFFICE VISIT (OUTPATIENT)
Dept: ORTHOPEDICS | Facility: CLINIC | Age: 48
End: 2023-02-09
Payer: COMMERCIAL

## 2023-02-09 VITALS
BODY MASS INDEX: 25.92 KG/M2 | SYSTOLIC BLOOD PRESSURE: 132 MMHG | WEIGHT: 175 LBS | DIASTOLIC BLOOD PRESSURE: 78 MMHG | HEIGHT: 69 IN

## 2023-02-09 DIAGNOSIS — M77.01 MEDIAL EPICONDYLITIS OF RIGHT ELBOW: Primary | ICD-10-CM

## 2023-02-09 DIAGNOSIS — M77.11 LATERAL EPICONDYLITIS, RIGHT ELBOW: ICD-10-CM

## 2023-02-09 PROCEDURE — 20550 NJX 1 TENDON SHEATH/LIGAMENT: CPT | Mod: RT,S$GLB,, | Performed by: ORTHOPAEDIC SURGERY

## 2023-02-09 PROCEDURE — 3075F SYST BP GE 130 - 139MM HG: CPT | Mod: CPTII,S$GLB,, | Performed by: ORTHOPAEDIC SURGERY

## 2023-02-09 PROCEDURE — 1160F PR REVIEW ALL MEDS BY PRESCRIBER/CLIN PHARMACIST DOCUMENTED: ICD-10-PCS | Mod: CPTII,S$GLB,, | Performed by: ORTHOPAEDIC SURGERY

## 2023-02-09 PROCEDURE — 3008F BODY MASS INDEX DOCD: CPT | Mod: CPTII,S$GLB,, | Performed by: ORTHOPAEDIC SURGERY

## 2023-02-09 PROCEDURE — 20550 PR INJECT TENDON SHEATH/LIGAMENT: ICD-10-PCS | Mod: RT,S$GLB,, | Performed by: ORTHOPAEDIC SURGERY

## 2023-02-09 PROCEDURE — 3075F PR MOST RECENT SYSTOLIC BLOOD PRESS GE 130-139MM HG: ICD-10-PCS | Mod: CPTII,S$GLB,, | Performed by: ORTHOPAEDIC SURGERY

## 2023-02-09 PROCEDURE — 1160F RVW MEDS BY RX/DR IN RCRD: CPT | Mod: CPTII,S$GLB,, | Performed by: ORTHOPAEDIC SURGERY

## 2023-02-09 PROCEDURE — 1159F PR MEDICATION LIST DOCUMENTED IN MEDICAL RECORD: ICD-10-PCS | Mod: CPTII,S$GLB,, | Performed by: ORTHOPAEDIC SURGERY

## 2023-02-09 PROCEDURE — 1159F MED LIST DOCD IN RCRD: CPT | Mod: CPTII,S$GLB,, | Performed by: ORTHOPAEDIC SURGERY

## 2023-02-09 PROCEDURE — 3008F PR BODY MASS INDEX (BMI) DOCUMENTED: ICD-10-PCS | Mod: CPTII,S$GLB,, | Performed by: ORTHOPAEDIC SURGERY

## 2023-02-09 PROCEDURE — 99213 PR OFFICE/OUTPT VISIT, EST, LEVL III, 20-29 MIN: ICD-10-PCS | Mod: 25,S$GLB,, | Performed by: ORTHOPAEDIC SURGERY

## 2023-02-09 PROCEDURE — 3078F PR MOST RECENT DIASTOLIC BLOOD PRESSURE < 80 MM HG: ICD-10-PCS | Mod: CPTII,S$GLB,, | Performed by: ORTHOPAEDIC SURGERY

## 2023-02-09 PROCEDURE — 99213 OFFICE O/P EST LOW 20 MIN: CPT | Mod: 25,S$GLB,, | Performed by: ORTHOPAEDIC SURGERY

## 2023-02-09 PROCEDURE — 3078F DIAST BP <80 MM HG: CPT | Mod: CPTII,S$GLB,, | Performed by: ORTHOPAEDIC SURGERY

## 2023-02-09 RX ORDER — TRIAMCINOLONE ACETONIDE 40 MG/ML
40 INJECTION, SUSPENSION INTRA-ARTICULAR; INTRAMUSCULAR
Status: DISCONTINUED | OUTPATIENT
Start: 2023-02-09 | End: 2023-02-09 | Stop reason: HOSPADM

## 2023-02-09 RX ADMIN — TRIAMCINOLONE ACETONIDE 40 MG: 40 INJECTION, SUSPENSION INTRA-ARTICULAR; INTRAMUSCULAR at 11:02

## 2023-02-09 NOTE — PROGRESS NOTES
[facility]    Subjective:     Chief Complaint:   Chief Complaint   Patient presents with    Right Elbow - Pain     Right medial elbow pain with burning. Has been going on for a while, however, it is progressively getting worse       No past medical history on file.    Past Surgical History:   Procedure Laterality Date    ANKLE SURGERY Left 2014    COLONOSCOPY N/A 1/14/2022    Procedure: COLONOSCOPY(pos Covid 01/01/22 home kit and pt went again but not ill if results come back positive);  Surgeon: Felipe Soares MD;  Location: [facility];  Service: Endoscopy;  Laterality: N/A;    OPEN REDUCTION AND INTERNAL FIXATION (ORIF) OF INJURY OF ANKLE Left 12/24/2020    Procedure: ORIF, ANKLE;  Surgeon: Jose Cortes MD;  Location: [facility] OR;  Service: Orthopedics;  Laterality: Left;       Current Outpatient Medications   Medication Sig    ALPRAZolam (XANAX) 1 MG tablet Take 1 tablet by mouth 3 (three) times daily as needed.    ALPRAZolam (XANAX) 1 MG tablet take 1 tablet by mouth three times daily as needed    ALPRAZolam (XANAX) 1 MG tablet take 1 tablet by mouth three times daily as needed    dextroamphetamine-amphetamine (ADDERALL XR) 30 MG 24 hr capsule Take 1 capsule by mouth 2 (two) times a day.     dextroamphetamine-amphetamine (ADDERALL XR) 30 MG 24 hr capsule Take 2 capsules by mouth once a day.    naproxen (NAPROSYN) 500 MG tablet Take 500 mg by mouth 2 (two) times daily.    valACYclovir (VALTREX) 1000 MG tablet Take 1 tablet by mouth 2 (two) times a day.    valACYclovir (VALTREX) 1000 MG tablet take 1 tablet by mouth twice daily    hydrocortisone 2.5 % cream Apply topically 2 (two) times daily. (Patient not taking: Reported on 2/9/2023)    ondansetron (ZOFRAN) 4 MG tablet Take 1 tablet by mouth every 6 hours as needed for nausea (Patient not taking: Reported on 2/9/2023)    oxyCODONE (ROXICODONE) 15 MG Tab Take 1 tablet by mouth every 4 hours as needed for post op pain. (Patient not taking: Reported on  9/7/2021)     No current facility-administered medications for this visit.       Review of patient's allergies indicates:   Allergen Reactions    Codeine      Patient was told so he has stayed away from it       Family History   Problem Relation Age of Onset    No Known Problems Mother     No Known Problems Father     No Known Problems Sister     No Known Problems Brother        Social History     Socioeconomic History    Marital status:    Tobacco Use    Smoking status: Never    Smokeless tobacco: Current     Types: Chew   Substance and Sexual Activity    Alcohol use: Yes     Comment: very rare    Drug use: No       History of present illness:  47-year-old male presents to clinic today for right elbow pain.  He is having tenderness over the medial aspect of his right elbow.  He has a history of bilateral medial and lateral epicondylitis in both elbows.  We went so far as the do a MRI of his left elbow back in 2021 which did show both medial and lateral epicondylitis.  He has just been dealing with the pain.      Review of Systems:    Constitution: Negative for chills, fever, and sweats.  Negative for unexplained weight loss.    HENT:  Negative for headaches and blurry vision.    Cardiovascular:Negative for chest pain or irregular heart beat. Negative for hypertension.    Respiratory:  Negative for cough and shortness of breath.    Gastrointestinal: Negative for abdominal pain, heartburn, melena, nausea, and vomitting.    Genitourinary:  Negative bladder incontinence and dysuria.    Musculoskeletal:  See HPI for details.     Neurological: Negative for numbness.    Psychiatric/Behavioral: Negative for depression.  The patient is not nervous/anxious.      Endocrine: Negative for polyuria    Hematologic/Lymphatic: Negative for bleeding problem.  Does not bruise/bleed easily.    Skin: Negative for poor would healing and rash    Objective:      Physical Examination:    Vital Signs:    Vitals:    02/09/23 1008  "  BP: 132/78       Body mass index is 25.84 kg/m².    This a well-developed, well nourished patient in no acute distress.  They are alert and oriented and cooperative to examination.        Examination of the right elbow, patient with tenderness over the medial and lateral epicondyles.  He is tender with both resisted flexion and extension as well as resisted pronation and supination over the mediolateral epicondyles.  Tinel's test is negative at the right elbow.  Distal, range of motion normal in the wrist hand and fingers.    Pertinent New Results:    XRAY Report / Interpretation:   AP and lateral views of the right elbow taken today in the office do not demonstrate any acute osseous abnormalities.  Visualized soft tissues appear normal.    Assessment/Plan:      Mediolateral epicondylitis right elbow, patient has long history of medial and lateral epicondylitis in both elbows.  We injected the right elbow today both medially and laterally for epicondylitis lidocaine and triamcinolone sterile technique.  Patient tolerated well.  We will do some physical therapy, activity modifications.  Will try to treat this non operatively as best we can.  He will follow up in 1 month.    Maco Pope, Physician Assistant, served in the capacity as a "scribe" for this patient encounter.  A "face-to-face" encounter occurred with Dr. Jose Cortes on this date.  The treatment plan and medical decision-making is outlined above. Patient was seen and examined with a chaperone.       This note was created using Dragon voice recognition software that occasionally misinterpreted phrases or words.        "

## 2023-02-09 NOTE — PROCEDURES
Tendon Sheath    Date/Time: 2/9/2023 11:00 AM  Performed by: Jose Cortes MD  Authorized by: Jose Cortes MD     Consent Done?:  Yes (Verbal)  Indications:  Pain  Site marked: the procedure site was marked    Timeout: prior to procedure the correct patient, procedure, and site was verified    Prep: patient was prepped and draped in usual sterile fashion      Local anesthesia used?: Yes    Anesthesia:  Local infiltration  Local anesthetic:  Lidocaine 1% without epinephrine  Location: RT lateral epicondyle.  Ultrasonic guidance for needle placement?: No    Needle size:  25 G  Medications:  40 mg triamcinolone acetonide 40 mg/mL  Patient tolerance:  Patient tolerated the procedure well with no immediate complications  Tendon Sheath    Date/Time: 2/9/2023 11:00 AM  Performed by: Jose Cortes MD  Authorized by: Jose Cortes MD     Consent Done?:  Yes (Verbal)  Indications:  Pain  Site marked: the procedure site was marked    Timeout: prior to procedure the correct patient, procedure, and site was verified    Prep: patient was prepped and draped in usual sterile fashion      Local anesthesia used?: Yes    Anesthesia:  Local infiltration  Local anesthetic:  Lidocaine 1% without epinephrine  Location: RT medial epicondyle.  Ultrasonic guidance for needle placement?: No    Needle size:  25 G  Medications:  40 mg triamcinolone acetonide 40 mg/mL  Patient tolerance:  Patient tolerated the procedure well with no immediate complications

## 2023-05-11 ENCOUNTER — OFFICE VISIT (OUTPATIENT)
Dept: ORTHOPEDICS | Facility: CLINIC | Age: 48
End: 2023-05-11
Payer: COMMERCIAL

## 2023-05-11 VITALS
DIASTOLIC BLOOD PRESSURE: 76 MMHG | HEIGHT: 69 IN | SYSTOLIC BLOOD PRESSURE: 118 MMHG | BODY MASS INDEX: 25.92 KG/M2 | WEIGHT: 175 LBS

## 2023-05-11 DIAGNOSIS — L03.011 INFECTED NAILBED OF FINGER, RIGHT: Primary | ICD-10-CM

## 2023-05-11 PROCEDURE — 3074F SYST BP LT 130 MM HG: CPT | Mod: CPTII,S$GLB,, | Performed by: ORTHOPAEDIC SURGERY

## 2023-05-11 PROCEDURE — 3078F DIAST BP <80 MM HG: CPT | Mod: CPTII,S$GLB,, | Performed by: ORTHOPAEDIC SURGERY

## 2023-05-11 PROCEDURE — 1160F RVW MEDS BY RX/DR IN RCRD: CPT | Mod: CPTII,S$GLB,, | Performed by: ORTHOPAEDIC SURGERY

## 2023-05-11 PROCEDURE — 3074F PR MOST RECENT SYSTOLIC BLOOD PRESSURE < 130 MM HG: ICD-10-PCS | Mod: CPTII,S$GLB,, | Performed by: ORTHOPAEDIC SURGERY

## 2023-05-11 PROCEDURE — 3078F PR MOST RECENT DIASTOLIC BLOOD PRESSURE < 80 MM HG: ICD-10-PCS | Mod: CPTII,S$GLB,, | Performed by: ORTHOPAEDIC SURGERY

## 2023-05-11 PROCEDURE — 3008F PR BODY MASS INDEX (BMI) DOCUMENTED: ICD-10-PCS | Mod: CPTII,S$GLB,, | Performed by: ORTHOPAEDIC SURGERY

## 2023-05-11 PROCEDURE — 1159F PR MEDICATION LIST DOCUMENTED IN MEDICAL RECORD: ICD-10-PCS | Mod: CPTII,S$GLB,, | Performed by: ORTHOPAEDIC SURGERY

## 2023-05-11 PROCEDURE — 99213 PR OFFICE/OUTPT VISIT, EST, LEVL III, 20-29 MIN: ICD-10-PCS | Mod: S$GLB,,, | Performed by: ORTHOPAEDIC SURGERY

## 2023-05-11 PROCEDURE — 99213 OFFICE O/P EST LOW 20 MIN: CPT | Mod: S$GLB,,, | Performed by: ORTHOPAEDIC SURGERY

## 2023-05-11 PROCEDURE — 1160F PR REVIEW ALL MEDS BY PRESCRIBER/CLIN PHARMACIST DOCUMENTED: ICD-10-PCS | Mod: CPTII,S$GLB,, | Performed by: ORTHOPAEDIC SURGERY

## 2023-05-11 PROCEDURE — 3008F BODY MASS INDEX DOCD: CPT | Mod: CPTII,S$GLB,, | Performed by: ORTHOPAEDIC SURGERY

## 2023-05-11 PROCEDURE — 1159F MED LIST DOCD IN RCRD: CPT | Mod: CPTII,S$GLB,, | Performed by: ORTHOPAEDIC SURGERY

## 2023-05-11 RX ORDER — SULFAMETHOXAZOLE AND TRIMETHOPRIM 800; 160 MG/1; MG/1
1 TABLET ORAL 2 TIMES DAILY
Qty: 14 TABLET | Refills: 0 | Status: SHIPPED | OUTPATIENT
Start: 2023-05-11 | End: 2023-06-05

## 2023-05-11 RX ORDER — SULFAMETHOXAZOLE AND TRIMETHOPRIM 800; 160 MG/1; MG/1
1 TABLET ORAL 2 TIMES DAILY
Qty: 14 TABLET | Refills: 0 | Status: SHIPPED | OUTPATIENT
Start: 2023-05-11 | End: 2023-05-11

## 2023-05-11 NOTE — PROGRESS NOTES
[facility]    Subjective:     Chief Complaint:   Chief Complaint   Patient presents with    Right Hand - Pain     R thumb pain, pain started a month ago, pain with any type of movement       History reviewed. No pertinent past medical history.    Past Surgical History:   Procedure Laterality Date    ANKLE SURGERY Left 2014    COLONOSCOPY N/A 1/14/2022    Procedure: COLONOSCOPY(pos Covid 01/01/22 home kit and pt went again but not ill if results come back positive);  Surgeon: Felipe Soares MD;  Location: [facility];  Service: Endoscopy;  Laterality: N/A;    OPEN REDUCTION AND INTERNAL FIXATION (ORIF) OF INJURY OF ANKLE Left 12/24/2020    Procedure: ORIF, ANKLE;  Surgeon: Jose Cortes MD;  Location: [facility] OR;  Service: Orthopedics;  Laterality: Left;       Current Outpatient Medications   Medication Sig    ALPRAZolam (XANAX) 1 MG tablet Take 1 tablet by mouth 3 (three) times daily as needed.    dextroamphetamine-amphetamine (ADDERALL XR) 30 MG 24 hr capsule Take 1 capsule by mouth 2 (two) times a day.     naproxen (NAPROSYN) 500 MG tablet Take 500 mg by mouth 2 (two) times daily.    valACYclovir (VALTREX) 1000 MG tablet Take 1 tablet by mouth 2 (two) times a day.    ALPRAZolam (XANAX) 1 MG tablet take 1 tablet by mouth three times daily as needed    ALPRAZolam (XANAX) 1 MG tablet take 1 tablet by mouth three times daily as needed    ALPRAZolam (XANAX) 1 MG tablet TAKE 1 TABLET BY MOUTH THREE TIMES DAILY AS NEEDED    dextroamphetamine-amphetamine (ADDERALL XR) 30 MG 24 hr capsule Take 2 capsules by mouth once a day    dextroamphetamine-amphetamine (MYDAYIS) 50 mg CT24 Take 1 capsule by mouth once daily.    hydrocortisone 2.5 % cream Apply topically 2 (two) times daily. (Patient not taking: Reported on 2/9/2023)    ondansetron (ZOFRAN) 4 MG tablet Take 1 tablet by mouth every 6 hours as needed for nausea (Patient not taking: Reported on 2/9/2023)    oxyCODONE (ROXICODONE) 15 MG Tab Take 1 tablet by mouth  every 4 hours as needed for post op pain. (Patient not taking: Reported on 9/7/2021)    tobramycin-dexAMETHasone 0.3-0.1% (TOBRADEX) 0.3-0.1 % DrpS Place 1 drop in the left eye 2 times a day for 5 days (Patient not taking: Reported on 5/11/2023)    valACYclovir (VALTREX) 1000 MG tablet take 1 tablet by mouth twice daily     No current facility-administered medications for this visit.       Review of patient's allergies indicates:   Allergen Reactions    Codeine      Patient was told so he has stayed away from it       Family History   Problem Relation Age of Onset    No Known Problems Mother     No Known Problems Father     No Known Problems Sister     No Known Problems Brother        Social History     Socioeconomic History    Marital status:    Tobacco Use    Smoking status: Never    Smokeless tobacco: Current     Types: Chew   Substance and Sexual Activity    Alcohol use: Yes     Comment: very rare    Drug use: No       History of present illness:  Patient comes in today for the right thumb.  He had an area in his right thumb where he was having some tenderness any drilled his right thumb with a drill over the nail he says he got some pus out.  That was yesterday.  About a month ago had a lot of pain that thumb.  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  He does not know of anyone particular event      Review of Systems:    Constitution: Negative for chills, fever, and sweats.  Negative for unexplained weight loss.    HENT:  Negative for headaches and blurry vision.    Cardiovascular:Negative for chest pain or irregular heart beat. Negative for hypertension.    Respiratory:  Negative for cough and shortness of breath.    Gastrointestinal: Negative for abdominal pain, heartburn, melena, nausea, and vomitting.    Genitourinary:  Negative bladder incontinence and dysuria.    Musculoskeletal:  See HPI for details.     Neurological: Negative for numbness.    Psychiatric/Behavioral: Negative for  depression.  The patient is not nervous/anxious.      Endocrine: Negative for polyuria    Hematologic/Lymphatic: Negative for bleeding problem.  Does not bruise/bleed easily.    Skin: Negative for poor would healing and rash    Objective:      Physical Examination:    Vital Signs:    Vitals:    05/11/23 1414   BP: 118/76       Body mass index is 25.84 kg/m².    This a well-developed, well nourished patient in no acute distress.  They are alert and oriented and cooperative to examination.        Patient has some chronic nail changes.  He has no palpable flocculence today.  He is nontender today.  No redness.  No areas of drainage  Pertinent New Results:    XRAY Report / Interpretation:   AP and lateral the right thumb demonstrate no fractures or foreign bodies    Assessment/Plan:      Right thumb infection.  It is unclear whether this is totally drained or not.  I put him on some Bactrim DS 1 a day for 5 days.  I will check him back if this recurs or gets worse      This note was created using Dragon voice recognition software that occasionally misinterpreted phrases or words.

## 2023-09-29 ENCOUNTER — OFFICE VISIT (OUTPATIENT)
Dept: ORTHOPEDICS | Facility: CLINIC | Age: 48
End: 2023-09-29
Payer: COMMERCIAL

## 2023-09-29 VITALS — BODY MASS INDEX: 25.92 KG/M2 | WEIGHT: 175 LBS | HEIGHT: 69 IN

## 2023-09-29 DIAGNOSIS — M77.01 MEDIAL EPICONDYLITIS OF RIGHT ELBOW: Primary | ICD-10-CM

## 2023-09-29 PROCEDURE — 99213 PR OFFICE/OUTPT VISIT, EST, LEVL III, 20-29 MIN: ICD-10-PCS | Mod: 25,S$GLB,, | Performed by: PHYSICIAN ASSISTANT

## 2023-09-29 PROCEDURE — 3008F BODY MASS INDEX DOCD: CPT | Mod: CPTII,S$GLB,, | Performed by: PHYSICIAN ASSISTANT

## 2023-09-29 PROCEDURE — 99213 OFFICE O/P EST LOW 20 MIN: CPT | Mod: 25,S$GLB,, | Performed by: PHYSICIAN ASSISTANT

## 2023-09-29 PROCEDURE — 1160F PR REVIEW ALL MEDS BY PRESCRIBER/CLIN PHARMACIST DOCUMENTED: ICD-10-PCS | Mod: CPTII,S$GLB,, | Performed by: PHYSICIAN ASSISTANT

## 2023-09-29 PROCEDURE — 20551 PR INJECT TENDON ORIGIN/INSERT: ICD-10-PCS | Mod: RT,S$GLB,, | Performed by: PHYSICIAN ASSISTANT

## 2023-09-29 PROCEDURE — 3008F PR BODY MASS INDEX (BMI) DOCUMENTED: ICD-10-PCS | Mod: CPTII,S$GLB,, | Performed by: PHYSICIAN ASSISTANT

## 2023-09-29 PROCEDURE — 1159F MED LIST DOCD IN RCRD: CPT | Mod: CPTII,S$GLB,, | Performed by: PHYSICIAN ASSISTANT

## 2023-09-29 PROCEDURE — 1159F PR MEDICATION LIST DOCUMENTED IN MEDICAL RECORD: ICD-10-PCS | Mod: CPTII,S$GLB,, | Performed by: PHYSICIAN ASSISTANT

## 2023-09-29 PROCEDURE — 1160F RVW MEDS BY RX/DR IN RCRD: CPT | Mod: CPTII,S$GLB,, | Performed by: PHYSICIAN ASSISTANT

## 2023-09-29 PROCEDURE — 20551 NJX 1 TENDON ORIGIN/INSJ: CPT | Mod: RT,S$GLB,, | Performed by: PHYSICIAN ASSISTANT

## 2023-09-29 RX ORDER — TRIAMCINOLONE ACETONIDE 40 MG/ML
40 INJECTION, SUSPENSION INTRA-ARTICULAR; INTRAMUSCULAR
Status: DISCONTINUED | OUTPATIENT
Start: 2023-09-29 | End: 2023-09-29 | Stop reason: HOSPADM

## 2023-09-29 RX ADMIN — TRIAMCINOLONE ACETONIDE 40 MG: 40 INJECTION, SUSPENSION INTRA-ARTICULAR; INTRAMUSCULAR at 12:09

## 2023-09-29 NOTE — PROCEDURES
Tendon Origin Injection Right Elbow    Date/Time: 9/29/2023 12:00 PM    Performed by: Maco Pope PA  Authorized by: Maco Pope PA    Consent Done?:  Yes (Verbal)  Indications:  Pain  Site marked: the procedure site was marked    Timeout: prior to procedure the correct patient, procedure, and site was verified    Prep: patient was prepped and draped in usual sterile fashion      Local anesthesia used?: Yes    Location: Right elbow.  Ultrasonic guidance for needle placement?: No    Needle size:  25 G  Medications:  40 mg triamcinolone acetonide 40 mg/mL  Patient tolerance:  Patient tolerated the procedure well with no immediate complications

## 2023-09-29 NOTE — PROGRESS NOTES
[facility]  [address]  [place] [address]  Phone: [phone]   [phone]    Patient's PCP: Saida Wilkins MD  Referring Provider: No ref. provider found    Subjective:      Chief Complaint:   Chief Complaint   Patient presents with    Right Elbow - Pain     Patient is here for a f/up on Right Elbow injection 2.9.23, states he hurt it yesterday hitting golf balls.        No past medical history on file.    Past Surgical History:   Procedure Laterality Date    ANKLE SURGERY Left 2014    COLONOSCOPY N/A 1/14/2022    Procedure: COLONOSCOPY(pos Covid 01/01/22 home kit and pt went again but not ill if results come back positive);  Surgeon: Felipe Soares MD;  Location: [facility];  Service: Endoscopy;  Laterality: N/A;    OPEN REDUCTION AND INTERNAL FIXATION (ORIF) OF INJURY OF ANKLE Left 12/24/2020    Procedure: ORIF, ANKLE;  Surgeon: Jose Cortes MD;  Location: [facility] OR;  Service: Orthopedics;  Laterality: Left;       Current Outpatient Medications   Medication Sig    ALPRAZolam (XANAX) 1 MG tablet Take 1 tablet three times a day as needed    dextroamphetamine-amphetamine (ADDERALL XR) 30 MG 24 hr capsule Take 2 capsules once a day    dextroamphetamine-amphetamine (MYDAYIS) 50 mg CT24 Take 1 capsule by mouth once daily.    naproxen (NAPROSYN) 500 MG tablet Take 500 mg by mouth 2 (two) times daily.    valACYclovir (VALTREX) 1000 MG tablet Take 1 tablet by mouth 2 (two) times a day.    valACYclovir (VALTREX) 1000 MG tablet take 1 tablet by mouth twice daily    ALPRAZolam (XANAX) 1 MG tablet Take 1 tablet by mouth 3 (three) times daily as needed.    ALPRAZolam (XANAX) 1 MG tablet take 1 tablet by mouth three times daily as needed (Patient not taking: Reported on 9/29/2023)    ALPRAZolam (XANAX) 1 MG tablet take 1 tablet by mouth three times daily as needed (Patient not taking: Reported on 9/29/2023)    ALPRAZolam (XANAX) 1 MG tablet TAKE 1 TABLET BY MOUTH THREE TIMES DAILY AS NEEDED  (Patient not taking: Reported on 9/29/2023)    ALPRAZolam (XANAX) 1 MG tablet Take 1 tablet (1 mg total) by mouth 3 (three) times daily as needed. (Patient not taking: Reported on 9/29/2023)    dextroamphetamine-amphetamine (ADDERALL XR) 30 MG 24 hr capsule Take 1 capsule by mouth 2 (two) times a day.     hydrocortisone 2.5 % cream Apply topically 2 (two) times daily. (Patient not taking: Reported on 2/9/2023)    ondansetron (ZOFRAN) 4 MG tablet Take 1 tablet by mouth every 6 hours as needed for nausea (Patient not taking: Reported on 2/9/2023)    oxyCODONE (ROXICODONE) 15 MG Tab Take 1 tablet by mouth every 4 hours as needed for post op pain. (Patient not taking: Reported on 9/7/2021)    tobramycin-dexAMETHasone 0.3-0.1% (TOBRADEX) 0.3-0.1 % DrpS Place 1 drop in the left eye 2 times a day for 5 days (Patient not taking: Reported on 5/11/2023)     No current facility-administered medications for this visit.       Review of patient's allergies indicates:   Allergen Reactions    Codeine      Patient was told so he has stayed away from it       Family History   Problem Relation Age of Onset    No Known Problems Mother     No Known Problems Father     No Known Problems Sister     No Known Problems Brother        Social History     Socioeconomic History    Marital status:    Tobacco Use    Smoking status: Never    Smokeless tobacco: Current     Types: Chew   Substance and Sexual Activity    Alcohol use: Yes     Comment: very rare    Drug use: No       Prior to meeting with the patient I reviewed the medical chart in [facility]. This included reviewing the previous progress notes from our office, review of the patient's last appointment with their primary care provider, review of any visits to the emergency room, and review of any pain management appointments or procedures.    History of present illness: 47-year-old male presents to clinic today for right elbow pain.  He is having tenderness over the medial aspect of  his right elbow.  He has a history of bilateral medial and lateral epicondylitis.  Last office visit was February of this year old reinjected both medial and lateral epicondyles of the right elbow.  Acutely today, he hit some balls at the driving range yesterday, he is got medial elbow pain on the right.    We went so far as the do a MRI of his left elbow back in 2021 which did show both medial and lateral epicondylitis.      He has just been dealing with the pain.    Review of Systems:    Constitutional: Negative for chills, fever and weight loss.   HENT: Negative for congestion.    Eyes: Negative for discharge and redness.   Respiratory: Negative for cough and shortness of breath.    Cardiovascular: Negative for chest pain.   Gastrointestinal: Negative for nausea and vomiting.   Musculoskeletal: See HPI.   Skin: Negative for rash.   Neurological: Negative for headaches.   Endo/Heme/Allergies: Does not bruise/bleed easily.   Psychiatric/Behavioral: The patient is not nervous/anxious.    All other systems reviewed and are negative.       Objective:      Physical Examination:    Vital Signs:  There were no vitals filed for this visit.    Body mass index is 25.84 kg/m².    This a well-developed, well nourished patient in no acute distress.  They are alert and oriented and cooperative to examination.     Examination of the right elbow, skin is dry and intact, no erythema ecchymosis, no signs symptoms of infection.  He is tender over the medial epicondyle, he is got pain with resisted flexion.  Otherwise normal range motion pronation supination flexion extension of the right elbow.  No symptoms distally, normal range of motion in the wrist hand and fingers.      Pertinent New Results:        XRAY Report / Interpretation:   AP lateral oblique views of the right elbow taken today in the office compared to images taken from February of this year unchanged.          Assessment:       1. Medial epicondylitis of right elbow       Plan:     Medial epicondylitis of right elbow  -     Cancel: X-Ray Elbow 2 Views Right  -     X-Ray Elbow Complete 3 views Right    Other orders  -     Tendon Sheath        Follow up if symptoms worsen or fail to improve.    Right medial epicondylitis, acutely flared up yesterday after golfing.  We injected over the flexor tendon origin lidocaine and triamcinolone sterile technique patient tolerated well.  This did well for him in February.  We will see if this settles down, we discussed activity modification follow-up in 1 month if symptoms persist.      ELICEO Pedraza, PA-C    This note was created using [employer] voice recognition software that occasionally misinterprets words or phrases.

## 2023-10-20 ENCOUNTER — OFFICE VISIT (OUTPATIENT)
Dept: ORTHOPEDICS | Facility: CLINIC | Age: 48
End: 2023-10-20
Payer: COMMERCIAL

## 2023-10-20 VITALS — BODY MASS INDEX: 25.18 KG/M2 | HEIGHT: 69 IN | WEIGHT: 170 LBS

## 2023-10-20 DIAGNOSIS — M51.36 DISC DEGENERATION, LUMBAR: ICD-10-CM

## 2023-10-20 DIAGNOSIS — M47.816 FACET ARTHRITIS OF LUMBAR REGION: Primary | ICD-10-CM

## 2023-10-20 DIAGNOSIS — M46.06 SPINAL ENTHESOPATHY OF LUMBAR REGION: ICD-10-CM

## 2023-10-20 PROCEDURE — 20552 TRIGGER POINT INJECTION: ICD-10-PCS | Mod: S$GLB,,, | Performed by: PHYSICIAN ASSISTANT

## 2023-10-20 PROCEDURE — 1159F MED LIST DOCD IN RCRD: CPT | Mod: CPTII,S$GLB,, | Performed by: PHYSICIAN ASSISTANT

## 2023-10-20 PROCEDURE — 3008F BODY MASS INDEX DOCD: CPT | Mod: CPTII,S$GLB,, | Performed by: PHYSICIAN ASSISTANT

## 2023-10-20 PROCEDURE — 1160F PR REVIEW ALL MEDS BY PRESCRIBER/CLIN PHARMACIST DOCUMENTED: ICD-10-PCS | Mod: CPTII,S$GLB,, | Performed by: PHYSICIAN ASSISTANT

## 2023-10-20 PROCEDURE — 99213 PR OFFICE/OUTPT VISIT, EST, LEVL III, 20-29 MIN: ICD-10-PCS | Mod: 25,S$GLB,, | Performed by: PHYSICIAN ASSISTANT

## 2023-10-20 PROCEDURE — 1160F RVW MEDS BY RX/DR IN RCRD: CPT | Mod: CPTII,S$GLB,, | Performed by: PHYSICIAN ASSISTANT

## 2023-10-20 PROCEDURE — 20552 NJX 1/MLT TRIGGER POINT 1/2: CPT | Mod: S$GLB,,, | Performed by: PHYSICIAN ASSISTANT

## 2023-10-20 PROCEDURE — 99213 OFFICE O/P EST LOW 20 MIN: CPT | Mod: 25,S$GLB,, | Performed by: PHYSICIAN ASSISTANT

## 2023-10-20 PROCEDURE — 1159F PR MEDICATION LIST DOCUMENTED IN MEDICAL RECORD: ICD-10-PCS | Mod: CPTII,S$GLB,, | Performed by: PHYSICIAN ASSISTANT

## 2023-10-20 PROCEDURE — 3008F PR BODY MASS INDEX (BMI) DOCUMENTED: ICD-10-PCS | Mod: CPTII,S$GLB,, | Performed by: PHYSICIAN ASSISTANT

## 2023-10-20 RX ADMIN — TRIAMCINOLONE ACETONIDE 40 MG: 40 INJECTION, SUSPENSION INTRA-ARTICULAR; INTRAMUSCULAR at 11:10

## 2023-10-20 NOTE — PROCEDURES
Trigger Point Injection    Performed by: Ezra Woody PA  Authorized by: Ezra Woody PA      Consent Done?:  Yes (Verbal)    Pre-Procedure:   Indications:  Pain relief  Site marked: the procedure site was marked     Timeout: prior to procedure the correct patient, procedure, and site was verified    Prep: patient was prepped and draped in usual sterile fashion     Local anesthesia used?: Yes    Local anesthetic:  Lidocaine 1% without epinephrine  Medications: 40 mg triamcinolone acetonide 40 mg/mL  Lumbosacral:  Left

## 2023-10-20 NOTE — PROGRESS NOTES
Subjective:       Patient ID: Jordan Mulligan is a 47 y.o. male.    Chief Complaint: Pain of the Lumbar Spine (Lumbar pain x weeks no leg symptoms, no incontinence, pian is pretty constant )      History of Present Illness    Prior to meeting with the patient I reviewed the medical chart in [facility]. This included reviewing the previous progress notes from our office, review of the patient's last appointment with their primary care provider, review of any visits to the emergency room, and review of any pain management appointments or procedures.   Patient here for initial evaluation chief complaint of left lumbar pain paraspinous musculature that started about 2 weeks ago with lifting a heavy object.  Denies any lower extremity symptomatology whatsoever.  No significant back problems in the past.    Current Medications  Current Outpatient Medications   Medication Sig Dispense Refill    ALPRAZolam (XANAX) 1 MG tablet Take 1 tablet (1 mg total) by mouth 3 (three) times daily as needed. 90 tablet 2    ALPRAZolam (XANAX) 1 MG tablet Take 1 tablet three times a day as needed 90 tablet 2    dextroamphetamine-amphetamine (ADDERALL XR) 30 MG 24 hr capsule Take 1 capsule by mouth 2 (two) times a day.       dextroamphetamine-amphetamine (ADDERALL XR) 30 MG 24 hr capsule Take 2 capsules (60 mg total) by mouth once daily. 60 capsule 0    dextroamphetamine-amphetamine (MYDAYIS) 50 mg CT24 Take 1 capsule by mouth once daily. 30 each 0    hydrocortisone 2.5 % cream Apply topically 2 (two) times daily. 28 g 2    naproxen (NAPROSYN) 500 MG tablet Take 500 mg by mouth 2 (two) times daily.      ondansetron (ZOFRAN) 4 MG tablet Take 1 tablet by mouth every 6 hours as needed for nausea 30 tablet 0    tobramycin-dexAMETHasone 0.3-0.1% (TOBRADEX) 0.3-0.1 % DrpS Place 1 drop in the left eye 2 times a day for 5 days 2.5 mL 0    valACYclovir (VALTREX) 1000 MG tablet take 1 tablet by mouth twice daily 60 tablet 5     No current  facility-administered medications for this visit.       Allergies  Review of patient's allergies indicates:   Allergen Reactions    Codeine      Patient was told so he has stayed away from it       Past Medical History  History reviewed. No pertinent past medical history.    Surgical History  Past Surgical History:   Procedure Laterality Date    ANKLE SURGERY Left 2014    COLONOSCOPY N/A 1/14/2022    Procedure: COLONOSCOPY(pos Covid 01/01/22 home kit and pt went again but not ill if results come back positive);  Surgeon: Felipe Soares MD;  Location: [facility];  Service: Endoscopy;  Laterality: N/A;    OPEN REDUCTION AND INTERNAL FIXATION (ORIF) OF INJURY OF ANKLE Left 12/24/2020    Procedure: ORIF, ANKLE;  Surgeon: Jose Cortes MD;  Location: [facility] OR;  Service: Orthopedics;  Laterality: Left;       Family History:   Family History   Problem Relation Age of Onset    No Known Problems Mother     No Known Problems Father     No Known Problems Sister     No Known Problems Brother        Social History:   Social History     Socioeconomic History    Marital status:    Tobacco Use    Smoking status: Never    Smokeless tobacco: Current     Types: Chew   Substance and Sexual Activity    Alcohol use: Yes     Comment: very rare    Drug use: No       Hospitalization/Major Diagnostic Procedure:     Review of Systems     General/Constitutional:  Chills denies. Fatigue denies. Fever denies. Weight gain denies. Weight loss denies.    Respiratory:  Shortness of breath denies.    Cardiovascular:  Chest pain denies.    Gastrointestinal:  Constipation denies. Diarrhea denies. Nausea denies. Vomiting denies.     Hematology:  Easy bruising denies. Prolonged bleeding denies.     Genitourinary:  Frequent urination denies. Pain in lower back denies. Painful urination denies.     Musculoskeletal:  See HPI for details    Skin:  Rash denies.    Neurologic:  Dizziness denies. Gait abnormalities denies. Seizures denies.  Tingling/Numbess denies.    Psychiatric:  Anxiety denies. Depressed mood denies.     Objective:   Vital Signs: There were no vitals filed for this visit.     Physical Exam      General Examination:     Constitutional: The patient is alert and oriented to lace person and time. Mood is pleasant.     Head/Face: Normal facial features normal eyebrows    Eyes: Normal extraocular motion bilaterally    Lungs: Respirations are equal and unlabored    Gait is coordinated.    Cardiovascular: There are no swelling or varicosities present.    Lymphatic: Negative for adenopathy    Skin: Normal    Neurological: Level of consciousness normal. Oriented to place person and time and situation    Psychiatric: Oriented to time place person and situation    Lumbar exam demonstrates some tenderness palpation left lumbar paraspinous musculature.  Pain is reproduced with lumbar flexion but range of motion is normal; extension is normal and pain free.  Bilateral lower extremities demonstrate full active range of motion, equal symmetric DTRs, normal strength and negative straight leg raise maneuver.    XRAY Report/ Interpretation :  AP pelvis x-rays taken in the office today reviewed the patient are normal.  Lumbar AP and lateral x-rays taken in the office today reviewed the patient demonstrates a sacralized L5 vertebrae and some mild decreased disc height L4-5 and L5-S1 as well as some facet sclerosis.      Assessment:       1. Facet arthritis of lumbar region    2. Disc degeneration, lumbar    3. Spinal enthesopathy of lumbar region        Plan:       Jordan was seen today for pain.    Diagnoses and all orders for this visit:    Facet arthritis of lumbar region  -     X-Ray Pelvis Routine AP  -     X-Ray Lumbar Spine Ap And Lateral    Disc degeneration, lumbar    Spinal enthesopathy of lumbar region         Follow up if symptoms worsen or fail to improve.    Today he was given a left paraspinal trigger point injection with 40 mg of Kenalog  and 1 cc lidocaine.  Please see procedure report for details.  If symptoms do not improve then give us a call and we will refer him to physical therapy for evaluation treatment.    Treatment options were discussed with regards to the nature of the medical condition. Conservative pain intervention and surgical options were discussed in detail. The probability of success of each separate treatment option was discussed. The patient expressed a clear understanding of the treatment options. With regards to surgery, the procedure risk, benefits, complications, and outcomes were discussed. No guarantees were given with regards to surgical outcome.   The risk of complications, morbidity, and mortality of patient management decisions have been made at the time of this visit. These are associated with the patient's problems, diagnostic procedures and treatment options. This includes the possible management options selected and those considered but not selected by the patient after shared medical decision making we discussed with the patient.   This note was created using Dragon voice recognition software that occasionally misinterpreted phrases or words.

## 2023-10-23 RX ORDER — TRIAMCINOLONE ACETONIDE 40 MG/ML
40 INJECTION, SUSPENSION INTRA-ARTICULAR; INTRAMUSCULAR
Status: DISCONTINUED | OUTPATIENT
Start: 2023-10-20 | End: 2023-10-23 | Stop reason: HOSPADM

## 2024-05-16 ENCOUNTER — OFFICE VISIT (OUTPATIENT)
Dept: ORTHOPEDICS | Facility: CLINIC | Age: 49
End: 2024-05-16
Payer: COMMERCIAL

## 2024-05-16 VITALS
DIASTOLIC BLOOD PRESSURE: 83 MMHG | WEIGHT: 165 LBS | HEIGHT: 69 IN | BODY MASS INDEX: 24.44 KG/M2 | SYSTOLIC BLOOD PRESSURE: 129 MMHG

## 2024-05-16 DIAGNOSIS — M77.01 MEDIAL EPICONDYLITIS OF RIGHT ELBOW: Primary | ICD-10-CM

## 2024-05-16 PROCEDURE — 3008F BODY MASS INDEX DOCD: CPT | Mod: CPTII,S$GLB,, | Performed by: ORTHOPAEDIC SURGERY

## 2024-05-16 PROCEDURE — 1159F MED LIST DOCD IN RCRD: CPT | Mod: CPTII,S$GLB,, | Performed by: ORTHOPAEDIC SURGERY

## 2024-05-16 PROCEDURE — 99213 OFFICE O/P EST LOW 20 MIN: CPT | Mod: 25,S$GLB,, | Performed by: ORTHOPAEDIC SURGERY

## 2024-05-16 PROCEDURE — 20550 NJX 1 TENDON SHEATH/LIGAMENT: CPT | Mod: RT,S$GLB,, | Performed by: ORTHOPAEDIC SURGERY

## 2024-05-16 PROCEDURE — 1160F RVW MEDS BY RX/DR IN RCRD: CPT | Mod: CPTII,S$GLB,, | Performed by: ORTHOPAEDIC SURGERY

## 2024-05-16 PROCEDURE — 3079F DIAST BP 80-89 MM HG: CPT | Mod: CPTII,S$GLB,, | Performed by: ORTHOPAEDIC SURGERY

## 2024-05-16 PROCEDURE — 3074F SYST BP LT 130 MM HG: CPT | Mod: CPTII,S$GLB,, | Performed by: ORTHOPAEDIC SURGERY

## 2024-05-16 RX ORDER — LISDEXAMFETAMINE DIMESYLATE 70 MG/1
70 CAPSULE ORAL EVERY MORNING
COMMUNITY
Start: 2024-04-17

## 2024-05-16 RX ORDER — TRIAMCINOLONE ACETONIDE 40 MG/ML
40 INJECTION, SUSPENSION INTRA-ARTICULAR; INTRAMUSCULAR
Status: DISCONTINUED | OUTPATIENT
Start: 2024-05-16 | End: 2024-05-16 | Stop reason: HOSPADM

## 2024-05-16 RX ORDER — FLUTICASONE PROPIONATE 50 MCG
2 SPRAY, SUSPENSION (ML) NASAL 2 TIMES DAILY
COMMUNITY
Start: 2024-04-17

## 2024-05-16 RX ADMIN — TRIAMCINOLONE ACETONIDE 40 MG: 40 INJECTION, SUSPENSION INTRA-ARTICULAR; INTRAMUSCULAR at 09:05

## 2024-05-16 NOTE — PROCEDURES
Tendon Sheath    Date/Time: 5/16/2024 9:15 AM    Performed by: Jose Cortes MD  Authorized by: Jose Cortes MD    Consent Done?:  Yes (Verbal)  Indications:  Pain  Site marked: the procedure site was marked    Timeout: prior to procedure the correct patient, procedure, and site was verified    Prep: patient was prepped and draped in usual sterile fashion      Location: Right Elbow.  Ultrasonic guidance for needle placement?: No    Needle size:  25 G  Medications:  40 mg triamcinolone acetonide 40 mg/mL  Patient tolerance:  Patient tolerated the procedure well with no immediate complications

## 2024-05-16 NOTE — PROGRESS NOTES
[facility]    Subjective:     Chief Complaint:   Chief Complaint   Patient presents with    Right Elbow - Pain     Patient is here for a f/u on right elbow pain.       No past medical history on file.    Past Surgical History:   Procedure Laterality Date    ANKLE SURGERY Left 2014    COLONOSCOPY N/A 1/14/2022    Procedure: COLONOSCOPY(pos Covid 01/01/22 home kit and pt went again but not ill if results come back positive);  Surgeon: Felipe Soares MD;  Location: [facility];  Service: Endoscopy;  Laterality: N/A;    OPEN REDUCTION AND INTERNAL FIXATION (ORIF) OF INJURY OF ANKLE Left 12/24/2020    Procedure: ORIF, ANKLE;  Surgeon: Jose Cortes MD;  Location: [facility] OR;  Service: Orthopedics;  Laterality: Left;       Current Outpatient Medications   Medication Sig    ALPRAZolam (XANAX) 1 MG tablet Take 1 tablet (1 mg total) by mouth 3 (three) times daily as needed.    ALPRAZolam (XANAX) 1 MG tablet Take 1 tablet three times a day as needed    dextroamphetamine-amphetamine (ADDERALL XR) 30 MG 24 hr capsule Take 1 capsule by mouth 2 (two) times a day.     dextroamphetamine-amphetamine (ADDERALL XR) 30 MG 24 hr capsule take 2 capsules by mouth  daily    dextroamphetamine-amphetamine (MYDAYIS) 50 mg CT24 Take 1 capsule by mouth once daily.    fluticasone propionate (FLONASE) 50 mcg/actuation nasal spray 2 sprays by Each Nostril route 2 (two) times daily.    hydrocortisone 2.5 % cream Apply topically 2 (two) times daily.    naproxen (NAPROSYN) 500 MG tablet Take 500 mg by mouth 2 (two) times daily.    ondansetron (ZOFRAN) 4 MG tablet Take 1 tablet by mouth every 6 hours as needed for nausea    tobramycin-dexAMETHasone 0.3-0.1% (TOBRADEX) 0.3-0.1 % DrpS Place 1 drop in the left eye 2 times a day for 5 days    valACYclovir (VALTREX) 1000 MG tablet take 1 tablet by mouth twice daily    VYVANSE 70 mg capsule Take 70 mg by mouth every morning.     No current facility-administered medications for this visit.        Review of patient's allergies indicates:   Allergen Reactions    Codeine      Patient was told so he has stayed away from it       Family History   Problem Relation Name Age of Onset    No Known Problems Mother      No Known Problems Father      No Known Problems Sister 2     No Known Problems Brother         Social History     Socioeconomic History    Marital status:    Tobacco Use    Smoking status: Never    Smokeless tobacco: Current     Types: Chew   Substance and Sexual Activity    Alcohol use: Yes     Comment: very rare    Drug use: No       History of present illness:  48-year-old male returns to clinic today for right elbow pain.  Last seen in the office in September, we injected the medial epicondyle.  He has had return of the symptoms.  He is having tenderness over the medial aspect of his right elbow.  He has a history of bilateral medial and lateral epicondylitis.  Previously injected both medial and lateral epicondyles of the right elbow.      We went so far as the do a MRI of his left elbow back in 2021 which did show both medial and lateral epicondylitis.       He has just been dealing with the pain.      Review of Systems:    Constitution: Negative for chills, fever, and sweats.  Negative for unexplained weight loss.    HENT:  Negative for headaches and blurry vision.    Cardiovascular:Negative for chest pain or irregular heart beat. Negative for hypertension.    Respiratory:  Negative for cough and shortness of breath.    Gastrointestinal: Negative for abdominal pain, heartburn, melena, nausea, and vomitting.    Genitourinary:  Negative bladder incontinence and dysuria.    Musculoskeletal:  See HPI for details.     Neurological: Negative for numbness.    Psychiatric/Behavioral: Negative for depression.  The patient is not nervous/anxious.      Endocrine: Negative for polyuria    Hematologic/Lymphatic: Negative for bleeding problem.  Does not bruise/bleed easily.    Skin: Negative for  "poor would healing and rash    Objective:      Physical Examination:    Vital Signs:    Vitals:    05/16/24 0815   BP: 129/83       Body mass index is 24.37 kg/m².    This a well-developed, well nourished patient in no acute distress.  They are alert and oriented and cooperative to examination.        Examination of the right elbow, skin is dry and intact, no erythema ecchymosis, no signs symptoms of infection. He is tender over the medial epicondyle, he is got pain with resisted flexion. Otherwise normal range motion pronation supination flexion extension of the right elbow. No symptoms distally, normal range of motion in the wrist hand and fingers.  Phalen's test is negative, Tinel's test was negative.    Pertinent New Results:    XRAY Report / Interpretation:       Assessment/Plan:      Medial epicondylitis, recalcitrant to conservative treatment multiple prior injections.  Two in the medial epicondyle and 1 to the lateral epicondyle over the last year.  We discussed advanced imaging today.  We decided to reinject the medial epicondyle and we will follow this closely.  If his pain returns within the next month, then we will order an MRI.  We discussed in great detail flexor tendon origin procedures.    Maco Pope, Physician Assistant, served in the capacity as a "scribe" for this patient encounter.  A "face-to-face" encounter occurred with Dr. Jose Cortes on this date.  The treatment plan and medical decision-making is outlined above. Patient was seen and examined with a chaperone.       This note was created using Dragon voice recognition software that occasionally misinterpreted phrases or words.          "

## 2024-06-12 ENCOUNTER — TELEPHONE (OUTPATIENT)
Dept: ORTHOPEDICS | Facility: CLINIC | Age: 49
End: 2024-06-12
Payer: COMMERCIAL

## 2024-06-12 DIAGNOSIS — M77.11 LATERAL EPICONDYLITIS, RIGHT ELBOW: Primary | ICD-10-CM

## 2024-06-12 NOTE — TELEPHONE ENCOUNTER
Per Dr Cortes, order MRI Right Elbow. Also needs letter for the gym that he is unable to work out. Letter placed up at .

## 2024-06-12 NOTE — LETTER
June 12, 2024      [facility]  [address]  [place] [address]  Phone: [phone]  Fax: [phone]       Patient: Jordan Mulligan   YOB: 1975  Date of Visit: 06/12/2024    To Whom It May Concern:    Jordan Mulligan, is unable to work out and use his gym membership until further notice. Please contact my office should you have any questions.    Sincerely,    Jose Cortes MD

## 2024-06-14 ENCOUNTER — HOSPITAL ENCOUNTER (OUTPATIENT)
Dept: RADIOLOGY | Facility: HOSPITAL | Age: 49
Discharge: HOME | End: 2024-06-14
Attending: ORTHOPAEDIC SURGERY
Payer: COMMERCIAL

## 2024-06-14 DIAGNOSIS — M77.11 LATERAL EPICONDYLITIS, RIGHT ELBOW: ICD-10-CM

## 2024-06-14 PROCEDURE — 73221 MRI JOINT UPR EXTREM W/O DYE: CPT | Mod: 26,RT,, | Performed by: RADIOLOGY

## 2024-06-14 PROCEDURE — 73221 MRI JOINT UPR EXTREM W/O DYE: CPT | Mod: TC,PO,RT

## 2025-08-06 ENCOUNTER — OFFICE VISIT (OUTPATIENT)
Dept: UROLOGY | Facility: CLINIC | Age: 50
End: 2025-08-06
Payer: COMMERCIAL

## 2025-08-06 VITALS — HEIGHT: 69 IN | WEIGHT: 165 LBS | BODY MASS INDEX: 24.44 KG/M2

## 2025-08-06 DIAGNOSIS — R30.0 DYSURIA: ICD-10-CM

## 2025-08-06 DIAGNOSIS — N48.89 PENILE PAIN: ICD-10-CM

## 2025-08-06 DIAGNOSIS — R39.9 UTI SYMPTOMS: Primary | ICD-10-CM

## 2025-08-06 DIAGNOSIS — N41.9 PROSTATITIS, UNSPECIFIED PROSTATITIS TYPE: ICD-10-CM

## 2025-08-06 LAB
BILIRUBIN, UA POC OHS: NEGATIVE
BLOOD, UA POC OHS: NEGATIVE
CLARITY, UA POC OHS: CLEAR
COLOR, UA POC OHS: YELLOW
GLUCOSE, UA POC OHS: NEGATIVE
KETONES, UA POC OHS: NEGATIVE
LEUKOCYTES, UA POC OHS: NEGATIVE
NITRITE, UA POC OHS: NEGATIVE
PH, UA POC OHS: 6.5
PROTEIN, UA POC OHS: NEGATIVE
SPECIFIC GRAVITY, UA POC OHS: 1.02
UROBILINOGEN, UA POC OHS: 0.2

## 2025-08-06 PROCEDURE — 81003 URINALYSIS AUTO W/O SCOPE: CPT | Mod: QW,S$GLB,, | Performed by: NURSE PRACTITIONER

## 2025-08-06 PROCEDURE — 99204 OFFICE O/P NEW MOD 45 MIN: CPT | Mod: S$GLB,,, | Performed by: NURSE PRACTITIONER

## 2025-08-06 PROCEDURE — 3008F BODY MASS INDEX DOCD: CPT | Mod: CPTII,S$GLB,, | Performed by: NURSE PRACTITIONER

## 2025-08-06 PROCEDURE — 1160F RVW MEDS BY RX/DR IN RCRD: CPT | Mod: CPTII,S$GLB,, | Performed by: NURSE PRACTITIONER

## 2025-08-06 PROCEDURE — 1159F MED LIST DOCD IN RCRD: CPT | Mod: CPTII,S$GLB,, | Performed by: NURSE PRACTITIONER

## 2025-08-06 PROCEDURE — 99999 PR PBB SHADOW E&M-EST. PATIENT-LVL IV: CPT | Mod: PBBFAC,,, | Performed by: NURSE PRACTITIONER

## 2025-08-06 RX ORDER — CIPROFLOXACIN 500 MG/1
500 TABLET, FILM COATED ORAL 2 TIMES DAILY
Qty: 60 TABLET | Refills: 0 | Status: SHIPPED | OUTPATIENT
Start: 2025-08-06 | End: 2025-09-05

## 2025-08-06 NOTE — PROGRESS NOTES
"[facility] Note  Staff: RANJEET Brothers-SHANNAN    PCP: LAWRENCE Wilkins    Chief Complaint: Penile pain    Subjective:        HPI: Jordan Mulligan is a 49 y.o. male NEW PT presents into office today with recent episode of dysuria/penile pain.         Initial OV RANJEET Lau  8/6/25:  Penile burning/pain, feels like a "UTI"  Symptoms happened one month ago then subsided and then yesterday felt similar symptoms.  No dysuria today.  Pt was recently on boat ride which could also be related to LUTS.  Gross Hematuria:  No  History of UTI: Yes - "UTI symptoms one month ago"  History of Kidney Stones?:  None  Constipation issues?:  None    REVIEW OF SYSTEMS:  A comprehensive 10 system review was performed and is negative except as noted above in HPI    PMHx:  History reviewed. No pertinent past medical history.    PSHx:  Past Surgical History:   Procedure Laterality Date    ANKLE SURGERY Left 2014    COLONOSCOPY N/A 01/14/2022    Procedure: COLONOSCOPY(pos Covid 01/01/22 home kit and pt went again but not ill if results come back positive);  Surgeon: Felipe Soares MD;  Location: [facility];  Service: Endoscopy;  Laterality: N/A;    [facility] surgury   2023    OPEN REDUCTION AND INTERNAL FIXATION (ORIF) OF INJURY OF ANKLE Left 12/24/2020    Procedure: ORIF, ANKLE;  Surgeon: Jose Cortes MD;  Location: [facility];  Service: Orthopedics;  Laterality: Left;       Allergies:  Codeine    Medications: reviewed   Objective:   There were no vitals filed for this visit.    Physical Exam  Constitutional:       Appearance: He is well-developed.   HENT:      Head: Normocephalic and atraumatic.      Right Ear: External ear normal.      Left Ear: External ear normal.      Nose: Nose normal.   Eyes:      Conjunctiva/sclera: Conjunctivae normal.      Pupils: Pupils are equal, round, and reactive to light.   Cardiovascular:      Rate and Rhythm: Normal rate and regular rhythm.      Heart sounds: Normal heart sounds. "   Pulmonary:      Effort: Pulmonary effort is normal.      Breath sounds: Normal breath sounds.   Abdominal:      General: Bowel sounds are normal.      Palpations: Abdomen is soft.   Musculoskeletal:         General: Normal range of motion.      Cervical back: Normal range of motion and neck supple.   Skin:     General: Skin is warm and dry.   Neurological:      Mental Status: He is alert and oriented to person, place, and time.      Deep Tendon Reflexes: Reflexes are normal and symmetric.   Psychiatric:         Behavior: Behavior normal.         Thought Content: Thought content normal.         Judgment: Judgment normal.           Assessment:       1. UTI symptoms    2. Penile pain    3. Dysuria    4. Prostatitis, unspecified prostatitis type          Plan:     Cipro 500 mg BID x 30 days for possible prostatitis at this time.  Explained to pt that we do not do prostate exam during possible prostatitis due to possible exacerbation of symptoms if actively infected during time of prostate check.  Avoid all bladder irritants-information discussed with pt today.    F/UP in one month after he completes full regimen of Cipro antibiotics to recheck LUTS and perform WILLIAMS check.  Pt verbalized understanding.    F/u 1-2 months.    I have spent over 30 min with this patient regarding discussion of the following:    -Discussed conservative measures to control urgency and frequency including   1. Avoiding/minimizing bladder irritants (see below), especially in afternoon and evening hours     Discussed bladder irritants include coffee (even decaf), tea, alcohol, soda, spicy foods, acidic juices (orange, tomato), vinegar, and artificial sweeteners/sugary beverages.     2. Do Timed Voiding - empty on a schedule (approx ~2-3 hours) in spite of need to urinate, to get ahead of urge     3. Do not postponing voiding - dont hold it on purpose      4. Bowel regimen as distended bowel has extrinsic compressive effect on bladder.   - any or  all of the following in any combination, titrate to soft daily bowel movement without pushing or straining  - colace/stool softener capsule - once to twice daily  - miralax - 1 capful daily to start, can increase to 2x daily (or decrease to 1/2 cap daily)  - increase dietary fibery  - fiber supplements, such as metamucil  - prunes, prune juice     5. INCREASE water intake     6. Stop fluids 2 hours before bed, and urinate just before bed      [facility]: Active    Kiki Magdaleno, FNP-C

## (undated) DEVICE — SUTURE VICRYL 2-0 CT-1 36 VCP945H

## (undated) DEVICE — SOLUTION IRRI NS BOTTLE 1000ML R5200-01

## (undated) DEVICE — STERISTRIP 1/2 R1547

## (undated) DEVICE — BANDAGE ACE STERILE 6 REB3116

## (undated) DEVICE — CUFF TOURNIQUET 34DUAL PRT 5921-034-235

## (undated) DEVICE — BANDAGE ESMARK 6X9 55516

## (undated) DEVICE — GLOVE BIOGEL PI   GOLD SIZE 8

## (undated) DEVICE — UNDERGLOVE BIOGEL MICRO BLUE SZ 8.5

## (undated) DEVICE — GLOVE BIOGEL PI  GOLD SZ 7

## (undated) DEVICE — DRAPE C-ARM (FITS NEW C-ARM) 07-CA108

## (undated) DEVICE — PAD BOVIE ADULT

## (undated) DEVICE — MASTISOL WITH SPRAY PUMP SURGICAL ADHESIVE 1/2 OZ

## (undated) DEVICE — SUTURE MONOCRYL 3-0 27 PS-1 MCP936H

## (undated) DEVICE — PADDING CAST 6INX4YD SPECIALIST 9046

## (undated) DEVICE — GLOVE BIOGEL PI GOLD SZ  8.5

## (undated) DEVICE — TRAY LOWER EXTREMITY  SMHS029-05

## (undated) DEVICE — UNDERGLOVE BIOGEL PI MICRO BLUE SZ 7.5

## (undated) DEVICE — DRILL BIT

## (undated) DEVICE — SPONGE GAUZE 10S 4X4  442214

## (undated) DEVICE — PADDING CAST 6 STERILE 30-322